# Patient Record
Sex: MALE | Race: OTHER | Employment: OTHER | URBAN - METROPOLITAN AREA
[De-identification: names, ages, dates, MRNs, and addresses within clinical notes are randomized per-mention and may not be internally consistent; named-entity substitution may affect disease eponyms.]

---

## 2018-06-24 ENCOUNTER — APPOINTMENT (OUTPATIENT)
Dept: GENERAL RADIOLOGY | Age: 83
End: 2018-06-24
Attending: EMERGENCY MEDICINE
Payer: MEDICAID

## 2018-06-24 ENCOUNTER — APPOINTMENT (OUTPATIENT)
Dept: CT IMAGING | Age: 83
End: 2018-06-24
Attending: EMERGENCY MEDICINE
Payer: MEDICAID

## 2018-06-24 ENCOUNTER — HOSPITAL ENCOUNTER (EMERGENCY)
Age: 83
Discharge: HOME | End: 2018-06-24
Attending: EMERGENCY MEDICINE
Payer: MEDICAID

## 2018-06-24 VITALS
BODY MASS INDEX: 25.71 KG/M2 | SYSTOLIC BLOOD PRESSURE: 159 MMHG | HEART RATE: 78 BPM | WEIGHT: 160 LBS | HEIGHT: 66 IN | DIASTOLIC BLOOD PRESSURE: 73 MMHG | TEMPERATURE: 97.9 F | OXYGEN SATURATION: 98 % | RESPIRATION RATE: 16 BRPM
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
## 2018-06-24 DIAGNOSIS — S20.222A BACK CONTUSION, LEFT, INITIAL ENCOUNTER: ICD-10-CM

## 2018-06-24 DIAGNOSIS — S00.83XA CONTUSION OF FACE, INITIAL ENCOUNTER: ICD-10-CM

## 2018-06-24 PROCEDURE — 71100 X-RAY EXAM RIBS UNI 2 VIEWS: CPT

## 2018-06-24 PROCEDURE — 72072 X-RAY EXAM THORAC SPINE 3VWS: CPT

## 2018-06-24 PROCEDURE — 99282 EMERGENCY DEPT VISIT SF MDM: CPT

## 2018-06-24 PROCEDURE — 70450 CT HEAD/BRAIN W/O DYE: CPT

## 2018-06-24 PROCEDURE — 71046 X-RAY EXAM CHEST 2 VIEWS: CPT

## 2018-06-24 RX ORDER — HYDROCODONE BITARTRATE AND ACETAMINOPHEN 5; 325 MG/1; MG/1
TABLET ORAL
Qty: 12 TAB | Refills: 0 | Status: SHIPPED | OUTPATIENT
Start: 2018-06-24 | End: 2021-10-28

## 2018-06-24 NOTE — ED NOTES
Patient armband removed and shredded. Family member given script x1 with discharge instructions and verbalizes understanding. See scanned documents for family member signing that she rec'd discharge instructions. Pt discharged home via w/c with family members. No distress noted.

## 2018-06-24 NOTE — ED PROVIDER NOTES
EMERGENCY DEPARTMENT HISTORY AND PHYSICAL EXAM    Date: 6/24/2018  Patient Name: Gabriel Eaton    History of Presenting Illness     Chief Complaint   Patient presents with    Fall    Back Pain         History Provided By: Patient    Chief Complaint: back pain  Duration: 1 Days  Timing:  Acute  Location: generalized back  Quality: Aching  Severity: Moderate  Associated Symptoms: neck pain    Additional History (Context):   12:41 PM  Gabriel Eaton is a 82 y.o. male with PMHX of HTN who presents to the emergency department C/O back pain onset yesterday after falling down stairs at home. Associated sxs include neck pain. Pt had tylenol 6 hours ago for relief. Pt usually maneuvers with cane. Pt denies hit to head, arm/leg pain, urinary or fecal incontinence, numbness/weakness and any other sxs or complaints. PCP: Zain Fofana MD        Past History     Past Medical History:  History reviewed. No pertinent past medical history. Past Surgical History:  History reviewed. No pertinent surgical history. Family History:  History reviewed. No pertinent family history. Social History:  Social History   Substance Use Topics    Smoking status: Former Smoker    Smokeless tobacco: None    Alcohol use No       Allergies:  No Known Allergies      Review of Systems   Review of Systems   Musculoskeletal: Positive for back pain and neck pain. Neurological: Negative for weakness and numbness. All other systems reviewed and are negative. Physical Exam     Vitals:    06/24/18 1219   BP: 159/73   Pulse: 78   Resp: 16   Temp: 97.9 °F (36.6 °C)   SpO2: 98%   Weight: 72.6 kg (160 lb)   Height: 5' 6\" (1.676 m)     Physical Exam   Nursing note and vitals reviewed.   Constitutional: elderly appearing, no acute distress  Head: Normocephalic, contusion and small abrasion to right forehead  Eyes: Pupils are equal, round, and reactive to light, EOMI  Neck: Supple, non-tender  Cardiovascular: Regular rate and rhythm, no murmurs, rubs, or gallops  Chest: Normal work of breathing and chest excursion bilaterally  Lungs: Clear to ausculation bilaterally  Abdomen: Soft, non tender, non distended, normoactive bowel sounds  Back: No evidence of trauma or deformity. Thoracic spine tenderness without bony point tenderness  Extremities: No evidence of trauma or deformity, no LE edema  Skin: Warm and dry, normal cap refill. Abrasion over left parathoracic back  Neuro: Alert and appropriate, CN intact, normal speech, strength and sensation symmetric bilaterally, normal coordination  Psychiatric: Normal mood and affect       Diagnostic Study Results     Labs -   No results found for this or any previous visit (from the past 12 hour(s)). Radiologic Studies -   XR RIBS LT UNI 2 V   Final Result   IMPRESSION:     Normal.    As read by the radiologist.     XR CHEST PA LAT   Final Result   IMPRESSION:     Minimal atelectasis right base. Chronic right apical pleural scarring. Old  fracture right clavicle. As read by the radiologist.     XR SPINE [facility] 3 V   Final Result   IMPRESSION:     Osseous findings of DISH. No compression deformity identified. As read by the radiologist.     CT HEAD WO CONT   Final Result   IMPRESSION:     1. No acute intracranial hemorrhage, mass effect, midline shift, or herniation. No definite CT evidence of acute cortical infarct is seen. Please note that  noncontrast head CT may be normal in early acute infarct. As read by the radiologist.     CT Results  (Last 48 hours)               06/24/18 1332  CT HEAD WO CONT Final result    Impression:  IMPRESSION:       1. No acute intracranial hemorrhage, mass effect, midline shift, or herniation. No definite CT evidence of acute cortical infarct is seen. Please note that   noncontrast head CT may be normal in early acute infarct. Narrative:  EXAM: CT head       INDICATION: Closed head trauma, status post fall.        COMPARISON: Brain MRI dated April 22, 2011. TECHNIQUE: Axial CT imaging of the head was performed without intravenous   contrast. Multiplanar reconstructions were performed. One or more dose   reduction techniques were used on this CT: automated exposure control,   adjustment of the mAs and/or kVp according to patient's size, and iterative   reconstruction techniques. The specific techniques utilized on this CT exam have   been documented in the patient's electronic medical record.       _______________       FINDINGS:       VENTRICLES/EXTRA-AXIAL SPACES: The ventricles and sulci are normal in their size   and configuration for age. No extra-axial fluid collections. BRAIN PARENCHYMA: There is no evidence of acute intracranial hemorrhage, mass   effect, midline shift, or herniation. No definite CT evidence of acute cortical   infarct is seen. The gray-white matter differentiation is within normal limits. OSSEOUS STRUCTURES: No fracture is seen. PARANASAL SINUSES/MASTOIDS: Visualized paranasal sinuses and mastoid air cells   are clear. ORBITS: The visualized orbits are unremarkable. OTHER: None.         _______________               CXR Results  (Last 48 hours)               06/24/18 1348  XR CHEST PA LAT Final result    Impression:  IMPRESSION:       Minimal atelectasis right base. Chronic right apical pleural scarring. Old   fracture right clavicle. Narrative:  Chest PA and lateral       HISTORY: Falling injury with posterior chest and thoracic pain. FINDINGS: Cardiac size is normal. Minimal atelectasis right base. Lungs   otherwise clear except for chronic right apical capping. Normal pulmonary   vasculature. Bridging hyperostosis thoracic vertebral column. Old fracture right   clavicle. Medications given in the ED-  Medications - No data to display      Medical Decision Making   I am the first provider for this patient.     I reviewed the vital signs, available nursing notes, past medical history, past surgical history, family history and social history. Vital Signs-Reviewed the patient's vital signs. Procedures:  Procedures    ED Course:   12:41 PM Initial assessment performed. The patients presenting problems have been discussed, and they are in agreement with the care plan formulated and outlined with them. I have encouraged them to ask questions as they arise throughout their visit. Diagnosis and Disposition     Discussion:  82 y.o male s/p mechanical fall. No acute abnormality on imaging. Plan for discharge home with family with pain management, discharge follow up and return precautions. Pt and family understand and agree with plan. DISCHARGE NOTE:  2:24 PM  Lizabeth Vasquez's  results have been reviewed with him. He has been counseled regarding his diagnosis, treatment, and plan. He verbally conveys understanding and agreement of the signs, symptoms, diagnosis, treatment and prognosis and additionally agrees to follow up as discussed. He also agrees with the care-plan and conveys that all of his questions have been answered. I have also provided discharge instructions for him that include: educational information regarding their diagnosis and treatment, and list of reasons why they would want to return to the ED prior to their follow-up appointment, should his condition change. He has been provided with education for proper emergency department utilization. CLINICAL IMPRESSION:    1. Fall, initial encounter    2. Contusion of face, initial encounter    3. Back contusion, left, initial encounter        PLAN:  1. D/C Home  2. Current Discharge Medication List      START taking these medications    Details   HYDROcodone-acetaminophen (NORCO) 5-325 mg per tablet Take 1-2 tablets PO every 4-6 hours as needed for pain control.   If over the counter ibuprofen or acetaminophen was suggested, then only take the vicodin for pain not well controlled with the over the counter medication. Qty: 12 Tab, Refills: 0    Associated Diagnoses: Contusion of face, initial encounter; Back contusion, left, initial encounter           3. Follow-up Information     Follow up With Details Comments Duane Draper MD Schedule an appointment as soon as possible for a visit in 3 days For primary care follow up. [id number] [facility] [address]      [facility] EMERGENCY DEPT Go to As needed, If symptoms worsen [address] [id number]  [phone]        _______________________________    Attestations: This note is prepared by Karin Romo and Brenda Acharya, acting as Scribe for Dontrell Estevez MD .    Dontrell Estevez MD:  The scribe's documentation has been prepared under my direction and personally reviewed by me in its entirety.   I confirm that the note above accurately reflects all work, treatment, procedures, and medical decision making performed by me.  _______________________________

## 2018-06-24 NOTE — DISCHARGE INSTRUCTIONS
Lesión en la zachary: Instrucciones de cuidado - [ Head Injury: Care Instructions ]  Instrucciones de cuidado    La mayoría de las lesiones en la zachary son Edwar Aldo. Los [employer], los dunbar y los raspones en la zachary y la duane suelen sanar tim y pueden tratarse igual que las lesiones en otras partes del cuerpo. Aunque es raro, de vez en cuando puede aparecer un problema más hubert después de aparicio regreso al [facility]. Por eso, es italia buena idea estar atento a síntomas por un [address] La atención de seguimiento es italia parte clave de aparicio tratamiento y seguridad. Asegúrese de hacer y acudir a todas las citas, y llame a aparicio médico si está teniendo problemas. También es italia buena idea saber los resultados de los exámenes y mantener italia lista de los medicamentos que jasbir. ¿Cómo puede cuidarse en el hogar? · Siga las instrucciones de aparicio médico. Él o jelani le dirá si necesita de alguien que lo observe atentamente salvador las 24 horas o más [place]. · No se esfuerce por unos días o más tiempo si no se siente tim. · Pregúntele a aparicio médico cuándo puede volver a actividades veronica manejar un coche, montar en bicicleta u operar maquinaria. ¿Cuándo debe pedir ayuda? Llame al 911 en cualquier momento que considere que necesita atención de [place]. Por ejemplo, llame si:  ? · Tiene un episodio de convulsiones. ? · Se desmayó (perdió el conocimiento). ? · Se siente confuso o no puede mantenerse despierto. ? Llame a aparicio médico ahora mismo o busque atención médica inmediata si:  ? · Tiene nuevo o peor vómito. ? · Se siente menos alerta. ? · Tiene nueva debilidad o entumecimiento en cualquier parte del cuerpo. ?Preste especial atención a los [facility], y asegúrese de comunicarse con aparicio médico si:  ? · No mejora veronica esperaba. ? · Tiene nuevos síntomas, veronica natalie de Juan Ramon, problemas para concentrarse o cambios en el estado de ánimo. ¿Dónde puede encontrar más información en inglés?   Laura Wong a [email or web address] Kiera Mix S634 en la búsqueda para aprender más acerca de \"Lesión en la zachary: Instrucciones de cuidado - [ Head Injury: Care Instructions ]. \"  Revisado: 14 octubre, 2016  Versión del contenido: 11.4  © 0844-4440 [employer], [employer]. Las instrucciones de cuidado fueron adaptadas bajo licencia por [employer] (which disclaims liability or warranty for this information). Si usted tiene [place] afección médica o sobre estas instrucciones, siempre pregunte a aparicio profesional de komal. [employer], [employer] niega toda garantía o responsabilidad por aparicio uso de esta información.

## 2018-06-24 NOTE — ED TRIAGE NOTES
Pt presents to ED today c/o back pain from a fall last night. Pt denies numbness, tingling or weakness.

## 2021-10-24 ENCOUNTER — HOSPITAL ENCOUNTER (INPATIENT)
Age: 86
LOS: 4 days | Discharge: HOME | DRG: 263 | End: 2021-10-28
Attending: EMERGENCY MEDICINE | Admitting: FAMILY MEDICINE
Payer: MEDICAID

## 2021-10-24 ENCOUNTER — APPOINTMENT (OUTPATIENT)
Dept: CT IMAGING | Age: 86
DRG: 263 | End: 2021-10-24
Attending: EMERGENCY MEDICINE
Payer: MEDICAID

## 2021-10-24 DIAGNOSIS — R10.84 ABDOMINAL PAIN, GENERALIZED: Primary | ICD-10-CM

## 2021-10-24 DIAGNOSIS — K80.12 CALCULUS OF GALLBLADDER WITH ACUTE ON CHRONIC CHOLECYSTITIS WITHOUT OBSTRUCTION: ICD-10-CM

## 2021-10-24 DIAGNOSIS — K80.20 CALCULUS OF GALLBLADDER WITHOUT CHOLECYSTITIS WITHOUT OBSTRUCTION: ICD-10-CM

## 2021-10-24 PROBLEM — R10.9 ABDOMINAL PAIN: Status: ACTIVE | Noted: 2021-10-24

## 2021-10-24 PROBLEM — R31.9 HEMATURIA: Status: ACTIVE | Noted: 2021-10-24

## 2021-10-24 PROBLEM — I10 HTN (HYPERTENSION): Status: ACTIVE | Noted: 2021-10-24

## 2021-10-24 PROBLEM — E78.5 HYPERLIPEMIA: Status: ACTIVE | Noted: 2021-10-24

## 2021-10-24 PROBLEM — N28.89 RENAL MASS: Status: ACTIVE | Noted: 2021-10-24

## 2021-10-24 PROBLEM — K80.70 CALCULUS OF GALLBLADDER AND BILE DUCT WITHOUT CHOLECYSTITIS: Status: ACTIVE | Noted: 2021-10-24

## 2021-10-24 PROBLEM — R11.2 NAUSEA AND VOMITING: Status: ACTIVE | Noted: 2021-10-24

## 2021-10-24 LAB
ALBUMIN SERPL-MCNC: 3.7 G/DL (ref 3.4–5)
ALBUMIN/GLOB SERPL: 0.9 {RATIO} (ref 0.8–1.7)
ALP SERPL-CCNC: 116 U/L (ref 45–117)
ALT SERPL-CCNC: 52 U/L (ref 16–61)
AMORPH CRY URNS QL MICRO: ABNORMAL
ANION GAP SERPL CALC-SCNC: 5 MMOL/L (ref 3–18)
APPEARANCE UR: ABNORMAL
AST SERPL-CCNC: 46 U/L (ref 10–38)
BACTERIA URNS QL MICRO: ABNORMAL /HPF
BASOPHILS # BLD: 0.1 K/UL (ref 0–0.1)
BASOPHILS NFR BLD: 1 % (ref 0–2)
BILIRUB SERPL-MCNC: 1.1 MG/DL (ref 0.2–1)
BILIRUB UR QL: NEGATIVE
BUN SERPL-MCNC: 19 MG/DL (ref 7–18)
BUN/CREAT SERPL: 30 (ref 12–20)
CALCIUM SERPL-MCNC: 9 MG/DL (ref 8.5–10.1)
CHLORIDE SERPL-SCNC: 103 MMOL/L (ref 100–111)
CO2 SERPL-SCNC: 29 MMOL/L (ref 21–32)
COLOR UR: YELLOW
CREAT SERPL-MCNC: 0.63 MG/DL (ref 0.6–1.3)
DIFFERENTIAL METHOD BLD: ABNORMAL
EOSINOPHIL # BLD: 0 K/UL (ref 0–0.4)
EOSINOPHIL NFR BLD: 0 % (ref 0–5)
EPITH CASTS URNS QL MICRO: ABNORMAL /LPF (ref 0–5)
ERYTHROCYTE [DISTWIDTH] IN BLOOD BY AUTOMATED COUNT: 13.4 % (ref 11.6–14.5)
GLOBULIN SER CALC-MCNC: 3.9 G/DL (ref 2–4)
GLUCOSE SERPL-MCNC: 142 MG/DL (ref 74–99)
GLUCOSE UR STRIP.AUTO-MCNC: NEGATIVE MG/DL
HCT VFR BLD AUTO: 47.3 % (ref 36–48)
HGB BLD-MCNC: 15.8 G/DL (ref 13–16)
HGB UR QL STRIP: ABNORMAL
KETONES UR QL STRIP.AUTO: 15 MG/DL
LACTATE SERPL-SCNC: 1.8 MMOL/L (ref 0.4–2)
LEUKOCYTE ESTERASE UR QL STRIP.AUTO: NEGATIVE
LIPASE SERPL-CCNC: 62 U/L (ref 73–393)
LYMPHOCYTES # BLD: 1.3 K/UL (ref 0.9–3.6)
LYMPHOCYTES NFR BLD: 12 % (ref 21–52)
MCH RBC QN AUTO: 30.7 PG (ref 24–34)
MCHC RBC AUTO-ENTMCNC: 33.4 G/DL (ref 31–37)
MCV RBC AUTO: 92 FL (ref 78–100)
MONOCYTES # BLD: 1 K/UL (ref 0.05–1.2)
MONOCYTES NFR BLD: 9 % (ref 3–10)
MUCOUS THREADS URNS QL MICRO: ABNORMAL /LPF
NEUTS SEG # BLD: 8.6 K/UL (ref 1.8–8)
NEUTS SEG NFR BLD: 78 % (ref 40–73)
NITRITE UR QL STRIP.AUTO: NEGATIVE
PH UR STRIP: 8.5 [PH] (ref 5–8)
PLATELET # BLD AUTO: 203 K/UL (ref 135–420)
PMV BLD AUTO: 10.5 FL (ref 9.2–11.8)
POTASSIUM SERPL-SCNC: 3.9 MMOL/L (ref 3.5–5.5)
PROT SERPL-MCNC: 7.6 G/DL (ref 6.4–8.2)
PROT UR STRIP-MCNC: 100 MG/DL
RBC # BLD AUTO: 5.14 M/UL (ref 4.35–5.65)
RBC #/AREA URNS HPF: ABNORMAL /HPF (ref 0–5)
SODIUM SERPL-SCNC: 137 MMOL/L (ref 136–145)
SP GR UR REFRACTOMETRY: 1.02 (ref 1–1.03)
TROPONIN I SERPL-MCNC: <0.02 NG/ML (ref 0–0.04)
UROBILINOGEN UR QL STRIP.AUTO: 1 EU/DL (ref 0.2–1)
WBC # BLD AUTO: 11.1 K/UL (ref 4.6–13.2)
WBC URNS QL MICRO: ABNORMAL /HPF (ref 0–5)

## 2021-10-24 PROCEDURE — 81001 URINALYSIS AUTO W/SCOPE: CPT

## 2021-10-24 PROCEDURE — 74011250637 HC RX REV CODE- 250/637: Performed by: EMERGENCY MEDICINE

## 2021-10-24 PROCEDURE — 74011250636 HC RX REV CODE- 250/636: Performed by: EMERGENCY MEDICINE

## 2021-10-24 PROCEDURE — 84484 ASSAY OF TROPONIN QUANT: CPT

## 2021-10-24 PROCEDURE — 85025 COMPLETE CBC W/AUTO DIFF WBC: CPT

## 2021-10-24 PROCEDURE — 99285 EMERGENCY DEPT VISIT HI MDM: CPT

## 2021-10-24 PROCEDURE — 80053 COMPREHEN METABOLIC PANEL: CPT

## 2021-10-24 PROCEDURE — 74011000636 HC RX REV CODE- 636: Performed by: EMERGENCY MEDICINE

## 2021-10-24 PROCEDURE — 93005 ELECTROCARDIOGRAM TRACING: CPT

## 2021-10-24 PROCEDURE — 96374 THER/PROPH/DIAG INJ IV PUSH: CPT

## 2021-10-24 PROCEDURE — 83605 ASSAY OF LACTIC ACID: CPT

## 2021-10-24 PROCEDURE — 83690 ASSAY OF LIPASE: CPT

## 2021-10-24 PROCEDURE — 74177 CT ABD & PELVIS W/CONTRAST: CPT

## 2021-10-24 PROCEDURE — 74011250636 HC RX REV CODE- 250/636: Performed by: FAMILY MEDICINE

## 2021-10-24 PROCEDURE — 65270000029 HC RM PRIVATE

## 2021-10-24 PROCEDURE — 96376 TX/PRO/DX INJ SAME DRUG ADON: CPT

## 2021-10-24 PROCEDURE — 74011000250 HC RX REV CODE- 250: Performed by: EMERGENCY MEDICINE

## 2021-10-24 PROCEDURE — 74011250637 HC RX REV CODE- 250/637: Performed by: FAMILY MEDICINE

## 2021-10-24 RX ORDER — AMLODIPINE BESYLATE 5 MG/1
5 TABLET ORAL DAILY
Status: DISCONTINUED | OUTPATIENT
Start: 2021-10-24 | End: 2021-10-28 | Stop reason: HOSPADM

## 2021-10-24 RX ORDER — MORPHINE SULFATE 4 MG/ML
4 INJECTION INTRAVENOUS
Status: COMPLETED | OUTPATIENT
Start: 2021-10-24 | End: 2021-10-24

## 2021-10-24 RX ORDER — HYDROCHLOROTHIAZIDE 12.5 MG/1
12.5 TABLET ORAL DAILY
COMMUNITY

## 2021-10-24 RX ORDER — MORPHINE SULFATE 4 MG/ML
4 INJECTION INTRAVENOUS
Status: DISCONTINUED | OUTPATIENT
Start: 2021-10-24 | End: 2021-10-25

## 2021-10-24 RX ORDER — ONDANSETRON 2 MG/ML
8 INJECTION INTRAMUSCULAR; INTRAVENOUS
Status: DISCONTINUED | OUTPATIENT
Start: 2021-10-24 | End: 2021-10-25

## 2021-10-24 RX ORDER — ATORVASTATIN CALCIUM 20 MG/1
20 TABLET, FILM COATED ORAL DAILY
COMMUNITY
End: 2021-10-28

## 2021-10-24 RX ORDER — MORPHINE SULFATE 4 MG/ML
4 INJECTION INTRAVENOUS ONCE
Status: COMPLETED | OUTPATIENT
Start: 2021-10-24 | End: 2021-10-24

## 2021-10-24 RX ORDER — SODIUM CHLORIDE 9 MG/ML
100 INJECTION, SOLUTION INTRAVENOUS CONTINUOUS
Status: DISCONTINUED | OUTPATIENT
Start: 2021-10-24 | End: 2021-10-25

## 2021-10-24 RX ADMIN — LIDOCAINE HYDROCHLORIDE 40 ML: 20 SOLUTION ORAL; TOPICAL at 15:40

## 2021-10-24 RX ADMIN — IOPAMIDOL 100 ML: 612 INJECTION, SOLUTION INTRAVENOUS at 12:29

## 2021-10-24 RX ADMIN — SODIUM CHLORIDE 1000 ML: 900 INJECTION, SOLUTION INTRAVENOUS at 11:56

## 2021-10-24 RX ADMIN — MORPHINE SULFATE 4 MG: 4 INJECTION INTRAVENOUS at 21:15

## 2021-10-24 RX ADMIN — AMLODIPINE BESYLATE 5 MG: 5 TABLET ORAL at 17:48

## 2021-10-24 RX ADMIN — MORPHINE SULFATE 4 MG: 4 INJECTION INTRAVENOUS at 17:45

## 2021-10-24 RX ADMIN — MORPHINE SULFATE 4 MG: 4 INJECTION INTRAVENOUS at 14:15

## 2021-10-24 RX ADMIN — ONDANSETRON 8 MG: 2 INJECTION INTRAMUSCULAR; INTRAVENOUS at 23:20

## 2021-10-24 RX ADMIN — ONDANSETRON 8 MG: 2 INJECTION INTRAMUSCULAR; INTRAVENOUS at 17:44

## 2021-10-24 RX ADMIN — SODIUM CHLORIDE 100 ML/HR: 900 INJECTION, SOLUTION INTRAVENOUS at 17:44

## 2021-10-24 RX ADMIN — MORPHINE SULFATE 4 MG: 4 INJECTION INTRAVENOUS at 11:52

## 2021-10-24 NOTE — ROUTINE PROCESS
TRANSFER - OUT REPORT:    Verbal report given to [facility] RN(name) on Jemma Jalloh  being transferred to [facility](unit) for routine progression of care       Report consisted of patients Situation, Background, Assessment and   Recommendations(SBAR). Information from the following report(s) SBAR was reviewed with the receiving nurse. Lines:   Peripheral IV 10/24/21 Left Antecubital (Active)   Site Assessment Clean, dry, & intact 10/24/21 1150   Phlebitis Assessment 0 10/24/21 1150   Infiltration Assessment 0 10/24/21 1150   Dressing Status Clean, dry, & intact 10/24/21 1150   Dressing Type Tape;Transparent 10/24/21 1150   Hub Color/Line Status Pink;Flushed;Patent 10/24/21 1150        Opportunity for questions and clarification was provided.       Patient transported with:   [employer]

## 2021-10-24 NOTE — ED PROVIDER NOTES
EMERGENCY DEPARTMENT HISTORY AND PHYSICAL EXAM    Date: 10/24/2021  Patient Name: Ramsey Marshall    History of Presenting Illness     Chief Complaint   Patient presents with    Abdominal Pain         History Provided By: Patient    Ramsey Marshall is a 82-year-old male with past medical history of hypertension presents for evaluation of abdominal pain. Patient reports abdominal distention and epigastric discomfort since yesterday evening. Patient states that his pain is sharp, denies any exacerbating or relieving factors. He had a small bowel movement this morning. Patient had reportedly told EMS that he has had similar symptoms with constipation, though he denies this. No previous abdominal surgeries, he is still passing gas otherwise. Patient denies any associated fever, cough, chest pain, shortness of breath. History is obtained via a [employer] [place] . PCP: Debbie Elder MD    Current Facility-Administered Medications   Medication Dose Route Frequency Provider Last Rate Last Admin    0.9% sodium chloride infusion  100 mL/hr IntraVENous CONTINUOUS Briana Allen MD        influenza vaccine 2021-22 (6 mos+)(PF) (FLUARIX/FLULAVAL/FLUZONE QUAD) injection 0.5 mL  1 Each IntraMUSCular PRIOR TO DISCHARGE Briana Allen MD        ondansetron [facility]) injection 8 mg  8 mg IntraVENous Q6H PRN Briana Allen MD        morphine injection 4 mg  4 mg IntraVENous Q4H PRN Briana Allen MD        amLODIPine (NORVASC) tablet 5 mg  5 mg Oral DAILY Briana Allen MD           Past History     Past Medical History:  No past medical history on file. Past Surgical History:  No past surgical history on file. Family History:  No family history on file.     Social History:  Social History     Tobacco Use    Smoking status: Former Smoker   Substance Use Topics    Alcohol use: No    Drug use: Not on file       Allergies:  No Known Allergies      Review of Systems   Review of Systems   Constitutional: Negative for activity change and fever. HENT: Negative for congestion and sore throat. Eyes: Negative for discharge. Respiratory: Negative for apnea. Cardiovascular: Negative for chest pain. Gastrointestinal: Positive for abdominal pain. Negative for abdominal distention. Genitourinary: Negative for dysuria and flank pain. Musculoskeletal: Negative for arthralgias. Skin: Negative for rash. Neurological: Negative for dizziness and weakness. Hematological: Negative for adenopathy. Psychiatric/Behavioral: Negative for agitation. All other systems reviewed and are negative.       Physical Exam     Vitals:    10/24/21 1445 10/24/21 1500 10/24/21 1641 10/24/21 1642   BP: (!) 169/77 (!) 162/62 (!) 194/79 (!) 193/72   Pulse: 61 (!) 57 68    Resp: 18 13 16    Temp:   99.2 °F (37.3 °C)    SpO2: 100% 98% 100%    Weight:         Physical Exam    Nursing notes and vital signs reviewed    Constitutional: Non toxic appearing, moderate distress  Head: Normocephalic, Atraumatic  Eyes: EOMI  Neck: Supple  Cardiovascular: Regular rate and rhythm, no murmurs, rubs, or gallops  Chest: Normal work of breathing and chest excursion bilaterally  Lungs: Clear to ausculation bilaterally  Abdomen: Soft, non tender, non distended, normoactive bowel sounds  Back: No evidence of trauma or deformity  Extremities: No evidence of trauma or deformity, no LE edema  Skin: Warm and dry, normal cap refill  Neuro: Alert and appropriate, CN intact, normal speech, strength and sensation full and symmetric bilaterally, normal gait, normal coordination  Psychiatric: Normal mood and affect      Diagnostic Study Results     Labs -     Recent Results (from the past 12 hour(s))   CBC WITH AUTOMATED DIFF    Collection Time: 10/24/21 11:42 AM   Result Value Ref Range    WBC 11.1 4.6 - 13.2 K/uL    RBC 5.14 4.35 - 5.65 M/uL    HGB 15.8 13.0 - 16.0 g/dL    HCT 47.3 36.0 - 48.0 %    MCV 92.0 78.0 - 100.0 FL    MCH 30.7 24.0 - 34.0 PG    MCHC 33.4 31.0 - 37.0 g/dL RDW 13.4 11.6 - 14.5 %    PLATELET 058 996 - 310 K/uL    MPV 10.5 9.2 - 11.8 FL    NEUTROPHILS 78 (H) 40 - 73 %    LYMPHOCYTES 12 (L) 21 - 52 %    MONOCYTES 9 3 - 10 %    EOSINOPHILS 0 0 - 5 %    BASOPHILS 1 0 - 2 %    ABS. NEUTROPHILS 8.6 (H) 1.8 - 8.0 K/UL    ABS. LYMPHOCYTES 1.3 0.9 - 3.6 K/UL    ABS. MONOCYTES 1.0 0.05 - 1.2 K/UL    ABS. EOSINOPHILS 0.0 0.0 - 0.4 K/UL    ABS. BASOPHILS 0.1 0.0 - 0.1 K/UL    DF AUTOMATED     METABOLIC PANEL, COMPREHENSIVE    Collection Time: 10/24/21 11:42 AM   Result Value Ref Range    Sodium 137 136 - 145 mmol/L    Potassium 3.9 3.5 - 5.5 mmol/L    Chloride 103 100 - 111 mmol/L    CO2 29 21 - 32 mmol/L    Anion gap 5 3.0 - 18 mmol/L    Glucose 142 (H) 74 - 99 mg/dL    BUN 19 (H) 7.0 - 18 MG/DL    Creatinine 0.63 0.6 - 1.3 MG/DL    BUN/Creatinine ratio 30 (H) 12 - 20      GFR est AA >60 >60 ml/min/1.73m2    GFR est non-AA >60 >60 ml/min/1.73m2    Calcium 9.0 8.5 - 10.1 MG/DL    Bilirubin, total 1.1 (H) 0.2 - 1.0 MG/DL    ALT (SGPT) 52 16 - 61 U/L    AST (SGOT) 46 (H) 10 - 38 U/L    Alk.  phosphatase 116 45 - 117 U/L    Protein, total 7.6 6.4 - 8.2 g/dL    Albumin 3.7 3.4 - 5.0 g/dL    Globulin 3.9 2.0 - 4.0 g/dL    A-G Ratio 0.9 0.8 - 1.7     LIPASE    Collection Time: 10/24/21 11:42 AM   Result Value Ref Range    Lipase 62 (L) 73 - 393 U/L   LACTIC ACID    Collection Time: 10/24/21 11:42 AM   Result Value Ref Range    Lactic acid 1.8 0.4 - 2.0 MMOL/L   TROPONIN I    Collection Time: 10/24/21 11:42 AM   Result Value Ref Range    Troponin-I, QT <0.02 0.0 - 0.045 NG/ML   URINALYSIS W/ RFLX MICROSCOPIC    Collection Time: 10/24/21 11:58 AM   Result Value Ref Range    Color YELLOW      Appearance TURBID      Specific gravity 1.022 1.005 - 1.030      pH (UA) 8.5 (H) 5.0 - 8.0      Protein 100 (A) NEG mg/dL    Glucose Negative NEG mg/dL    Ketone 15 (A) NEG mg/dL    Bilirubin Negative NEG      Blood LARGE (A) NEG      Urobilinogen 1.0 0.2 - 1.0 EU/dL    Nitrites Negative NEG Leukocyte Esterase Negative NEG     URINE MICROSCOPIC ONLY    Collection Time: 10/24/21 11:58 AM   Result Value Ref Range    WBC 0 to 3 0 - 5 /hpf    RBC TOO NUMEROUS TO COUNT 0 - 5 /hpf    Epithelial cells FEW 0 - 5 /lpf    Bacteria NONE SEEN NEG /hpf    Mucus 1+ (A) NEG /lpf    Amorphous Crystals 2+ (A) NEG       Radiologic Studies -   CT ABD PELV W CONT   Final Result      1. Cholelithiasis without evidence of cholecystitis. 2. Left inguinal hernia containing a portion of the sigmoid colon. No evidence   of bowel obstruction. 3. Multiple colonic diverticula without diverticulitis. 4. Probable hepatic steatosis. 5. Imaging stigmata in keeping with ankylosing spondylitis. No superimposed   acute osseous abnormality. Advanced bilateral hip joint arthritis. 6. Multiple bilateral renal cysts with indeterminate posterior interpolar left   renal structure. If further characterization is required, nonemergent/routine   renal protocol CT follow-up could be considered. CT Results  (Last 48 hours)               10/24/21 1240  CT ABD PELV W CONT Final result    Impression:      1. Cholelithiasis without evidence of cholecystitis. 2. Left inguinal hernia containing a portion of the sigmoid colon. No evidence   of bowel obstruction. 3. Multiple colonic diverticula without diverticulitis. 4. Probable hepatic steatosis. 5. Imaging stigmata in keeping with ankylosing spondylitis. No superimposed   acute osseous abnormality. Advanced bilateral hip joint arthritis. 6. Multiple bilateral renal cysts with indeterminate posterior interpolar left   renal structure. If further characterization is required, nonemergent/routine   renal protocol CT follow-up could be considered. Narrative:  EXAM: CT of the abdomen and pelvis       INDICATION: 82year-old patient with abdominal pain       COMPARISON: None.        TECHNIQUE: Axial CT imaging of the abdomen and pelvis was performed with   intravenous contrast. Multiplanar reformats were generated. One or more dose reduction techniques were used on this CT: automated exposure   control, adjustment of the mAs and/or kVp according to patient size, and   iterative reconstruction techniques. The specific techniques used on this CT   exam have been documented in the patient's electronic medical record. Digital   Imaging and Communications in Medicine (DICOM) format image data are available   to nonaffiliated external healthcare facilities or entities on a secure, media   free, reciprocally searchable basis with patient authorization for at least a   12-month period after this study. _______________       FINDINGS:       LOWER CHEST: Mild dependent atelectasis. No alveolar consolidation or pleural   abnormality. Normal cardiac size. No pericardial effusion. LIVER, BILIARY: Diffuse hepatic hypoattenuation without suspicious hepatic   lesion. No biliary dilation. Small gallstones are present within a   nondistended/noninflamed gallbladder. PANCREAS: Mild fatty infiltration the pancreas. No pancreatic ductal dilatation. SPLEEN: Normal.       ADRENALS: Normal.       KIDNEYS/URETERS/BLADDER: Symmetric renal enhancement. No hydronephrosis. Several   bilateral renal hypodensities are present, too small to accurately characterize. In addition, there is posterior interpolar predominantly exophytic intermediate   density left renal lesion measuring approximately 2.3 x 2.4 cm in size. Urinary   bladder normal in CT appearance to       PELVIC ORGANS: Left inguinal hernia present containing a small portion of the   sigmoid colon. VASCULATURE: Diffuse atherosclerosis. No evidence of aneurysmal dilatation. LYMPH NODES: No enlarged lymph nodes.        GASTROINTESTINAL TRACT:      > At the site of left inguinal hernia, no accompanying bowel obstruction or   inflammatory change involving the adjacent sigmoid colon. Multiple colonic   diverticula are present without evidence of diverticulitis.      > Normal appendix.      > Small hiatal hernia. BONES: Extensive bridging syndesmophyte formation noted throughout the thoracic   and lumbar spine. Ankylosis of bilateral sacroiliac joints. Advanced bilateral   hip joint arthritis. No acute osseous abnormality. OTHER: Faint groundglass density is present at the root of the mesentery. _______________               CXR Results  (Last 48 hours)    None          Medications given in the ED-  Medications   0.9% sodium chloride infusion (has no administration in time range)   influenza vaccine 2021-22 (6 mos+)(PF) (FLUARIX/FLULAVAL/FLUZONE QUAD) injection 0.5 mL (has no administration in time range)   ondansetron (ZOFRAN) injection 8 mg (has no administration in time range)   morphine injection 4 mg (has no administration in time range)   amLODIPine (NORVASC) tablet 5 mg (has no administration in time range)   morphine injection 4 mg (4 mg IntraVENous Given 10/24/21 1152)   sodium chloride 0.9 % bolus infusion 1,000 mL (0 mL IntraVENous Stopped 10/24/21 1227)   iopamidoL (ISOVUE 300) 61 % contrast injection 100 mL (100 mL IntraVENous Given 10/24/21 1229)   morphine injection 4 mg (4 mg IntraVENous Given 10/24/21 1415)   mylanta/viscous lidocaine (GI COCKTAIL) (40 mL Oral Given 10/24/21 1540)         Medical Decision Making   I am the first provider for this patient. I reviewed the vital signs, available nursing notes, past medical history, past surgical history, family history and social history. Vital Signs-Reviewed the patient's vital signs.     Pulse Oximetry Analysis - 100% on room air, not hypoxic     Cardiac Monitor:  Rate: 63 bpm  Rhythm: Normal sinus rhythm    EKG interpretation: (Preliminary)  EKG read by Dr. Precious English at 1134   Sinus bradycardia, rate 56  Left bundle branch block,   No previous EKG for comparison  No evidence of acute ischemia    Records Reviewed: Old Medical Records    Provider Notes (Medical Decision Making): Jcarlos Encarnacion is a 82 y.o. male presents for evaluation of epigastric and right upper quadrant pain. On arrival patient is afebrile, nontoxic-appearing and hemodynamically stable. Patient found to have mild abdominal distention and discomfort. CBC, CMP, lipase, EKG, troponin obtained. Patient is found to have a left bundle branch block, no previous EKG is available, this is felt to likely otherwise be old without any active chest pain at this time with negative troponin. CT abdomen pelvis revealed evidence of cholelithiasis without cholecystitis. Patient otherwise also with incidental left renal mass that is noted. On repeat evaluation patient has required multiple doses of morphine without significant improvement in his pain. Discussed with Dr. Angelica Aquino of general surgery who will evaluate the patient for consideration of elective cholecystectomy tomorrow should his pain fail to improve. Patient will be admitted to the hospitalist for further pain control and monitoring. Updated patient and daughter on findings and plan. Procedures:  Procedures    ED Course:       Diagnosis and Disposition     CONSULT NOTE:   I spoke with Dr. Angelica Aquino   Specialty: gen surgery  Discussed pt's hx, disposition, and available diagnostic and imaging results. Reviewed care plans. Consulting physician agrees with plans as outlined. .   Written by Alexus Villaseñor MD    ADMISSION NOTE:  Patient is being admitted to the hospital by Dr. Ciaran Ruvalcaba. The results of their tests and reasons for their admission have been discussed with them and/or available family. They convey agreement and understanding for the need to be admitted and for their admission diagnosis. Written by Alexus Villaseñor MD    CONDITIONS ON ADMISSION:  Deep Vein Thrombosis is not present at the time of admission. Thrombosis is not present at the time of admission.  Urinary Tract Infection is not present at the time of admission. Pneumonia is not present at the time of admission. MRSA is not present at the time of admission. Wound infection is not present at the time of admission. Pressure Ulcer is not present at the time of admission. CLINICAL IMPRESSION    1. Abdominal pain, generalized    2. Calculus of gallbladder without cholecystitis without obstruction        Current Discharge Medication List        3. Follow-up Information    None       _______________________________      Please note that this dictation was completed with [employer], the computer voice recognition software. Quite often unanticipated grammatical, syntax, homophones, and other interpretive errors are inadvertently transcribed by the computer software. Please disregard these errors. Please excuse any errors that have escaped final proofreading.

## 2021-10-24 NOTE — PROGRESS NOTES
1631-Report received awaiting pt.    1907-Verbal shift change report given to [facility] [place] [place] by Mallory Gutierrez RN. Report included the following information SBAR, Kardex, Summary, Intake/Output and MAR.

## 2021-10-24 NOTE — H&P
History & Physical    Patient: Johnny Mae MRN: [id number]  CSN: [id number]    YOB: 1935  Age: 82 y.o. Sex: male      DOA: 10/24/2021  Primary Care Provider:  Reinaldo Mahajan MD      Assessment/Plan     Patient Active Problem List   Diagnosis Code    Abdominal pain R10.9    Calculus of gallbladder and bile duct without cholecystitis K80.70    Renal mass N28.89    Hematuria R31.9    Nausea and vomiting R11.2    HTN (hypertension) I10    Hyperlipemia E78.5     Admitted to general medical floor    Abdominal pain: Possible secondary to his cholelithiasis vs renal mass. NPO. IVF. Morphine prn for pain  CT abd/pelvic IMPRESSION     1. Cholelithiasis without evidence of cholecystitis.     2. Left inguinal hernia containing a portion of the sigmoid colon. No evidence  of bowel obstruction.     3. Multiple colonic diverticula without diverticulitis.     4. Probable hepatic steatosis.     5. Imaging stigmata in keeping with ankylosing spondylitis. No superimposed  acute osseous abnormality. Advanced bilateral hip joint arthritis.     6. Multiple bilateral renal cysts with indeterminate posterior interpolar left  renal structure. If further characterization is required, nonemergent/routine  renal protocol CT follow-up could be considered. Cholelithiasis: Per CT. + abdominal pain and nausea with no acute cholecystitis. Consult to general surgery. HTN: Amlodipine PO. Pain control. Monitor BP closely    Left Renal mass with hematuria: per CT. May get renal protocol CT when pain is under the control. May consider consult to urology inpatient vs outpatient.      N/V: Zofran prn    DVT/GI prophylaixs    Supportive care      Full code    Estimated length of stay: 2-3 days    CC: abdominal pain for 2 days, worsening with N/V since this am       HPI:     Johnny Mae is a 82 y.o. male Vincentian-speaking, with past medical history of hypertension, hyperlipidemia who was brought by EMS today for 2 days history of constant abdominal pain, worsening with nausea vomiting since this morning. Patient states that his pain located to right upper abdominal, sharp, 7 out of 10 with no radiation. It was associated with abdominal bloating, nausea and dry heaves since this morning. He also constipated with a small bowel movement early this morning. No fevers chills. No bloody diarrhea. No cough no chest pain, no shortness of breath. He has not been able to eat since last night for which EMS was called and brought him to the ED. At the ED, work-up reviewed with gallbladder stone without acute Cholecystitis, he still in pain with IV morphine, his blood pressure elevated >193/90. Given his advanced age, a hospital admission was requested for further evaluation and treatment. He is fully vaccinated with Covid vaccine. Denies recent exposure to the Covid. History is obtained via a     No past medical history on file. No past surgical history on file. No family history on file. Social History     Socioeconomic History    Marital status:      Spouse name: Not on file    Number of children: Not on file    Years of education: Not on file    Highest education level: Not on file   Tobacco Use    Smoking status: Former Smoker   Substance and Sexual Activity    Alcohol use: No     Social Determinants of Health     Financial Resource Strain:     Difficulty of Paying Living Expenses:    Food Insecurity:     Worried About Running Out of Food in the Last Year:     [address] in the Last Year:    Transportation Needs:     Lack of Transportation (Medical):      Lack of Transportation (Non-Medical):    Physical Activity:     Days of Exercise per Week:     Minutes of Exercise per Session:    Stress:     Feeling of Stress :    Social Connections:     Frequency of Communication with Friends and Family:     Frequency of Social Gatherings with Friends and Family:     Attends [employer] Services:     Active Member of Clubs or Organizations:     Attends Club or Organization Meetings:     Marital Status:        Prior to Admission medications    Medication Sig Start Date End Date Taking? Authorizing Provider   atorvastatin (LIPITOR) 20 mg tablet Take 20 mg by mouth daily. Yes Provider, Historical   hydroCHLOROthiazide (HYDRODIURIL) 12.5 mg tablet Take 12.5 mg by mouth daily. Yes Provider, Historical   HYDROcodone-acetaminophen (NORCO) 5-325 mg per tablet Take 1-2 tablets PO every 4-6 hours as needed for pain control. If over the counter ibuprofen or acetaminophen was suggested, then only take the vicodin for pain not well controlled with the over the counter medication. 18   Hugo Tate MD       No Known Allergies    Review of Systems  Gen: No fever, chills, malaise, + weight loss. Heent: No headache, rhinorrhea, epistaxis, ear pain, hearing loss, sinus pain, neck pain/stiffness, sore throat. Heart: No chest pain, palpitations, CUTLER, pnd, or orthopnea. Resp: No cough, hemoptysis, wheezing and shortness of breath. GI: + nausea/ vomiting, No diarrhea, constipation, melena or hematochezia. + abdominal   : No urinary obstruction, dysuria. + hematuria. Derm: No rash, new skin lesion or pruritis. Musc/skeletal: no bone or joint complains. Vasc: No edema, cyanosis or claudication. Endo: No heat/cold intolerance, no polyuria,polydipsia or polyphagia. Neuro: No unilateral weakness, numbness, tingling. No seizures. Heme: No easy bruising or bleeding. Physical Exam:     Physical Exam:  Visit Vitals  BP (!) 193/72 (BP 1 Location: Left arm, BP Patient Position: Supine)   Pulse 68   Temp 99.2 °F (37.3 °C)   Resp 16   Wt 74.6 kg (164 lb 8 oz)   SpO2 100%   BMI 26.55 kg/m²      O2 Device: None (Room air)    Temp (24hrs), Av.7 °F (37.1 °C), Min:98.2 °F (36.8 °C), Max:99.2 °F (37.3 °C)    No intake/output data recorded.    No intake/output data recorded. General:  Awake, cooperative,in pain   Head:  Normocephalic, without obvious abnormality, atraumatic. Eyes:  Conjunctivae/corneas clear, sclera anicteric, PERRL, EOMs intact. Nose: Nares normal. No drainage or sinus tenderness. Throat: Lips, mucosa dry    Neck: Supple, symmetrical, trachea midline, no adenopathy. Lungs:   Clear to auscultation bilaterally. Heart:  Regular rate and rhythm, S1, S2 normal, no murmur, click, rub or gallop. Abdomen: + Distention. TTP diffusely. Bowel sounds normal.    Extremities: Extremities normal, atraumatic, no cyanosis or edema. Capillary refill normal..   Pulses: 2+ and symmetric all extremities. Skin: Skin flushed, turgor normal. No rashes or lesions   Neurologic: CNII-XII intact. No focal motor or sensory deficit. Labs Reviewed:      Recent Results (from the past 24 hour(s))   CBC WITH AUTOMATED DIFF    Collection Time: 10/24/21 11:42 AM   Result Value Ref Range    WBC 11.1 4.6 - 13.2 K/uL    RBC 5.14 4.35 - 5.65 M/uL    HGB 15.8 13.0 - 16.0 g/dL    HCT 47.3 36.0 - 48.0 %    MCV 92.0 78.0 - 100.0 FL    MCH 30.7 24.0 - 34.0 PG    MCHC 33.4 31.0 - 37.0 g/dL    RDW 13.4 11.6 - 14.5 %    PLATELET 766 724 - 485 K/uL    MPV 10.5 9.2 - 11.8 FL    NEUTROPHILS 78 (H) 40 - 73 %    LYMPHOCYTES 12 (L) 21 - 52 %    MONOCYTES 9 3 - 10 %    EOSINOPHILS 0 0 - 5 %    BASOPHILS 1 0 - 2 %    ABS. NEUTROPHILS 8.6 (H) 1.8 - 8.0 K/UL    ABS. LYMPHOCYTES 1.3 0.9 - 3.6 K/UL    ABS. MONOCYTES 1.0 0.05 - 1.2 K/UL    ABS. EOSINOPHILS 0.0 0.0 - 0.4 K/UL    ABS.  BASOPHILS 0.1 0.0 - 0.1 K/UL    DF AUTOMATED     METABOLIC PANEL, COMPREHENSIVE    Collection Time: 10/24/21 11:42 AM   Result Value Ref Range    Sodium 137 136 - 145 mmol/L    Potassium 3.9 3.5 - 5.5 mmol/L    Chloride 103 100 - 111 mmol/L    CO2 29 21 - 32 mmol/L    Anion gap 5 3.0 - 18 mmol/L    Glucose 142 (H) 74 - 99 mg/dL    BUN 19 (H) 7.0 - 18 MG/DL    Creatinine 0.63 0.6 - 1.3 MG/DL    BUN/Creatinine ratio 30 (H) 12 - 20      GFR est AA >60 >60 ml/min/1.73m2    GFR est non-AA >60 >60 ml/min/1.73m2    Calcium 9.0 8.5 - 10.1 MG/DL    Bilirubin, total 1.1 (H) 0.2 - 1.0 MG/DL    ALT (SGPT) 52 16 - 61 U/L    AST (SGOT) 46 (H) 10 - 38 U/L    Alk.  phosphatase 116 45 - 117 U/L    Protein, total 7.6 6.4 - 8.2 g/dL    Albumin 3.7 3.4 - 5.0 g/dL    Globulin 3.9 2.0 - 4.0 g/dL    A-G Ratio 0.9 0.8 - 1.7     LIPASE    Collection Time: 10/24/21 11:42 AM   Result Value Ref Range    Lipase 62 (L) 73 - 393 U/L   LACTIC ACID    Collection Time: 10/24/21 11:42 AM   Result Value Ref Range    Lactic acid 1.8 0.4 - 2.0 MMOL/L   TROPONIN I    Collection Time: 10/24/21 11:42 AM   Result Value Ref Range    Troponin-I, QT <0.02 0.0 - 0.045 NG/ML   URINALYSIS W/ RFLX MICROSCOPIC    Collection Time: 10/24/21 11:58 AM   Result Value Ref Range    Color YELLOW      Appearance TURBID      Specific gravity 1.022 1.005 - 1.030      pH (UA) 8.5 (H) 5.0 - 8.0      Protein 100 (A) NEG mg/dL    Glucose Negative NEG mg/dL    Ketone 15 (A) NEG mg/dL    Bilirubin Negative NEG      Blood LARGE (A) NEG      Urobilinogen 1.0 0.2 - 1.0 EU/dL    Nitrites Negative NEG      Leukocyte Esterase Negative NEG     URINE MICROSCOPIC ONLY    Collection Time: 10/24/21 11:58 AM   Result Value Ref Range    WBC 0 to 3 0 - 5 /hpf    RBC TOO NUMEROUS TO COUNT 0 - 5 /hpf    Epithelial cells FEW 0 - 5 /lpf    Bacteria NONE SEEN NEG /hpf    Mucus 1+ (A) NEG /lpf    Amorphous Crystals 2+ (A) NEG       Procedures/imaging: see electronic medical records for all procedures/Xrays and details which were not copied into this note but were reviewed prior to creation of Plan      CC: Isiah Singleton MD

## 2021-10-25 ENCOUNTER — APPOINTMENT (OUTPATIENT)
Dept: GENERAL RADIOLOGY | Age: 86
DRG: 263 | End: 2021-10-25
Attending: HOSPITALIST
Payer: MEDICAID

## 2021-10-25 ENCOUNTER — APPOINTMENT (OUTPATIENT)
Dept: ULTRASOUND IMAGING | Age: 86
DRG: 263 | End: 2021-10-25
Attending: SURGERY
Payer: MEDICAID

## 2021-10-25 ENCOUNTER — ANESTHESIA EVENT (OUTPATIENT)
Dept: SURGERY | Age: 86
DRG: 263 | End: 2021-10-25
Payer: MEDICAID

## 2021-10-25 ENCOUNTER — ANESTHESIA (OUTPATIENT)
Dept: SURGERY | Age: 86
DRG: 263 | End: 2021-10-25
Payer: MEDICAID

## 2021-10-25 PROBLEM — R93.2 ABNORMAL LIVER ULTRASOUND: Status: ACTIVE | Noted: 2021-10-25

## 2021-10-25 LAB
ATRIAL RATE: 56 BPM
CALCULATED P AXIS, ECG09: 40 DEGREES
CALCULATED R AXIS, ECG10: 9 DEGREES
CALCULATED T AXIS, ECG11: 114 DEGREES
DIAGNOSIS, 93000: NORMAL
P-R INTERVAL, ECG05: 160 MS
Q-T INTERVAL, ECG07: 464 MS
QRS DURATION, ECG06: 136 MS
QTC CALCULATION (BEZET), ECG08: 447 MS
VENTRICULAR RATE, ECG03: 56 BPM

## 2021-10-25 PROCEDURE — 71045 X-RAY EXAM CHEST 1 VIEW: CPT

## 2021-10-25 PROCEDURE — 77030022704 HC SUT VLOC COVD -B: Performed by: SURGERY

## 2021-10-25 PROCEDURE — 76210000006 HC OR PH I REC 0.5 TO 1 HR: Performed by: SURGERY

## 2021-10-25 PROCEDURE — 0FT44ZZ RESECTION OF GALLBLADDER, PERCUTANEOUS ENDOSCOPIC APPROACH: ICD-10-PCS | Performed by: SURGERY

## 2021-10-25 PROCEDURE — 77030018836 HC SOL IRR NACL ICUM -A: Performed by: SURGERY

## 2021-10-25 PROCEDURE — 77030010507 HC ADH SKN DERMBND J&J -B: Performed by: SURGERY

## 2021-10-25 PROCEDURE — 0FB04ZX EXCISION OF LIVER, PERCUTANEOUS ENDOSCOPIC APPROACH, DIAGNOSTIC: ICD-10-PCS | Performed by: SURGERY

## 2021-10-25 PROCEDURE — 88307 TISSUE EXAM BY PATHOLOGIST: CPT

## 2021-10-25 PROCEDURE — 77030006643: Performed by: ANESTHESIOLOGY

## 2021-10-25 PROCEDURE — 74011000250 HC RX REV CODE- 250: Performed by: NURSE ANESTHETIST, CERTIFIED REGISTERED

## 2021-10-25 PROCEDURE — 77030040504 HC DRN WND MDII -B: Performed by: SURGERY

## 2021-10-25 PROCEDURE — 77030002933 HC SUT MCRYL J&J -A: Performed by: SURGERY

## 2021-10-25 PROCEDURE — 77030031139 HC SUT VCRL2 J&J -A: Performed by: SURGERY

## 2021-10-25 PROCEDURE — 77030034154 HC SHR COAG HARM ACE J&J -F: Performed by: SURGERY

## 2021-10-25 PROCEDURE — 77030012770 HC TRCR OPT FX AMR -B: Performed by: SURGERY

## 2021-10-25 PROCEDURE — 76010000131 HC OR TIME 2 TO 2.5 HR: Performed by: SURGERY

## 2021-10-25 PROCEDURE — 77030003578 HC NDL INSUF VERES AMR -B: Performed by: SURGERY

## 2021-10-25 PROCEDURE — 77030002916 HC SUT ETHLN J&J -A: Performed by: SURGERY

## 2021-10-25 PROCEDURE — 74011000250 HC RX REV CODE- 250: Performed by: SURGERY

## 2021-10-25 PROCEDURE — 77030020829: Performed by: SURGERY

## 2021-10-25 PROCEDURE — 76705 ECHO EXAM OF ABDOMEN: CPT

## 2021-10-25 PROCEDURE — 74011250636 HC RX REV CODE- 250/636: Performed by: SURGERY

## 2021-10-25 PROCEDURE — 88313 SPECIAL STAINS GROUP 2: CPT

## 2021-10-25 PROCEDURE — 77030008683 HC TU ET CUF COVD -A: Performed by: ANESTHESIOLOGY

## 2021-10-25 PROCEDURE — 74011000258 HC RX REV CODE- 258: Performed by: SURGERY

## 2021-10-25 PROCEDURE — 2709999900 HC NON-CHARGEABLE SUPPLY: Performed by: SURGERY

## 2021-10-25 PROCEDURE — 77030027138 HC INCENT SPIROMETER -A

## 2021-10-25 PROCEDURE — 77030010030: Performed by: SURGERY

## 2021-10-25 PROCEDURE — 77030012799 HC TRCR GELPRT BLN AMR -B: Performed by: SURGERY

## 2021-10-25 PROCEDURE — 74011250636 HC RX REV CODE- 250/636: Performed by: NURSE ANESTHETIST, CERTIFIED REGISTERED

## 2021-10-25 PROCEDURE — 77010033678 HC OXYGEN DAILY

## 2021-10-25 PROCEDURE — 77030018875 HC APPL CLP LIG4 J&J -B: Performed by: SURGERY

## 2021-10-25 PROCEDURE — 74011250637 HC RX REV CODE- 250/637: Performed by: FAMILY MEDICINE

## 2021-10-25 PROCEDURE — 65270000029 HC RM PRIVATE

## 2021-10-25 PROCEDURE — 77030040506 HC DRN WND MDII -A: Performed by: SURGERY

## 2021-10-25 PROCEDURE — 77030008477 HC STYL SATN SLP COVD -A: Performed by: ANESTHESIOLOGY

## 2021-10-25 PROCEDURE — 76060000035 HC ANESTHESIA 2 TO 2.5 HR: Performed by: SURGERY

## 2021-10-25 PROCEDURE — 77030008574 HC TBNG SUC IRR STRY -B: Performed by: SURGERY

## 2021-10-25 PROCEDURE — 77030009403 HC ELECTRD ENDO MEGA -B: Performed by: SURGERY

## 2021-10-25 PROCEDURE — 77030008608 HC TRCR ENDOSC SMTH AMR -B: Performed by: SURGERY

## 2021-10-25 PROCEDURE — 74011250636 HC RX REV CODE- 250/636: Performed by: FAMILY MEDICINE

## 2021-10-25 PROCEDURE — 88304 TISSUE EXAM BY PATHOLOGIST: CPT

## 2021-10-25 RX ORDER — MORPHINE SULFATE 2 MG/ML
1 INJECTION, SOLUTION INTRAMUSCULAR; INTRAVENOUS
Status: DISCONTINUED | OUTPATIENT
Start: 2021-10-25 | End: 2021-10-26

## 2021-10-25 RX ORDER — NALOXONE HYDROCHLORIDE 0.4 MG/ML
0.1 INJECTION, SOLUTION INTRAMUSCULAR; INTRAVENOUS; SUBCUTANEOUS
Status: DISCONTINUED | OUTPATIENT
Start: 2021-10-25 | End: 2021-10-25 | Stop reason: HOSPADM

## 2021-10-25 RX ORDER — FENTANYL CITRATE 50 UG/ML
INJECTION, SOLUTION INTRAMUSCULAR; INTRAVENOUS AS NEEDED
Status: DISCONTINUED | OUTPATIENT
Start: 2021-10-25 | End: 2021-10-25 | Stop reason: HOSPADM

## 2021-10-25 RX ORDER — DEXTROSE 50 % IN WATER (D50W) INTRAVENOUS SYRINGE
25-50 AS NEEDED
Status: DISCONTINUED | OUTPATIENT
Start: 2021-10-25 | End: 2021-10-25 | Stop reason: HOSPADM

## 2021-10-25 RX ORDER — LIDOCAINE HYDROCHLORIDE 20 MG/ML
INJECTION, SOLUTION EPIDURAL; INFILTRATION; INTRACAUDAL; PERINEURAL AS NEEDED
Status: DISCONTINUED | OUTPATIENT
Start: 2021-10-25 | End: 2021-10-25 | Stop reason: HOSPADM

## 2021-10-25 RX ORDER — OXYCODONE HYDROCHLORIDE 5 MG/1
5 TABLET ORAL
Status: DISCONTINUED | OUTPATIENT
Start: 2021-10-25 | End: 2021-10-28 | Stop reason: HOSPADM

## 2021-10-25 RX ORDER — CEFAZOLIN SODIUM/WATER 2 G/20 ML
2 SYRINGE (ML) INTRAVENOUS ONCE
Status: COMPLETED | OUTPATIENT
Start: 2021-10-25 | End: 2021-10-25

## 2021-10-25 RX ORDER — ONDANSETRON 2 MG/ML
4 INJECTION INTRAMUSCULAR; INTRAVENOUS
Status: DISCONTINUED | OUTPATIENT
Start: 2021-10-25 | End: 2021-10-28

## 2021-10-25 RX ORDER — PROPOFOL 10 MG/ML
INJECTION, EMULSION INTRAVENOUS AS NEEDED
Status: DISCONTINUED | OUTPATIENT
Start: 2021-10-25 | End: 2021-10-25 | Stop reason: HOSPADM

## 2021-10-25 RX ORDER — ONDANSETRON 2 MG/ML
INJECTION INTRAMUSCULAR; INTRAVENOUS AS NEEDED
Status: DISCONTINUED | OUTPATIENT
Start: 2021-10-25 | End: 2021-10-25 | Stop reason: HOSPADM

## 2021-10-25 RX ORDER — BUPIVACAINE HYDROCHLORIDE AND EPINEPHRINE 5; 5 MG/ML; UG/ML
INJECTION, SOLUTION EPIDURAL; INTRACAUDAL; PERINEURAL AS NEEDED
Status: DISCONTINUED | OUTPATIENT
Start: 2021-10-25 | End: 2021-10-25 | Stop reason: HOSPADM

## 2021-10-25 RX ORDER — SODIUM CHLORIDE, SODIUM LACTATE, POTASSIUM CHLORIDE, CALCIUM CHLORIDE 600; 310; 30; 20 MG/100ML; MG/100ML; MG/100ML; MG/100ML
50 INJECTION, SOLUTION INTRAVENOUS CONTINUOUS
Status: DISCONTINUED | OUTPATIENT
Start: 2021-10-25 | End: 2021-10-25 | Stop reason: HOSPADM

## 2021-10-25 RX ORDER — MAGNESIUM SULFATE 100 %
4 CRYSTALS MISCELLANEOUS AS NEEDED
Status: DISCONTINUED | OUTPATIENT
Start: 2021-10-25 | End: 2021-10-25 | Stop reason: HOSPADM

## 2021-10-25 RX ORDER — SODIUM CHLORIDE, SODIUM LACTATE, POTASSIUM CHLORIDE, CALCIUM CHLORIDE 600; 310; 30; 20 MG/100ML; MG/100ML; MG/100ML; MG/100ML
50 INJECTION, SOLUTION INTRAVENOUS CONTINUOUS
Status: DISCONTINUED | OUTPATIENT
Start: 2021-10-25 | End: 2021-10-28

## 2021-10-25 RX ORDER — ONDANSETRON 2 MG/ML
4 INJECTION INTRAMUSCULAR; INTRAVENOUS ONCE
Status: DISCONTINUED | OUTPATIENT
Start: 2021-10-25 | End: 2021-10-25 | Stop reason: HOSPADM

## 2021-10-25 RX ORDER — SODIUM CHLORIDE, SODIUM LACTATE, POTASSIUM CHLORIDE, CALCIUM CHLORIDE 600; 310; 30; 20 MG/100ML; MG/100ML; MG/100ML; MG/100ML
125 INJECTION, SOLUTION INTRAVENOUS CONTINUOUS
Status: DISCONTINUED | OUTPATIENT
Start: 2021-10-25 | End: 2021-10-25

## 2021-10-25 RX ORDER — INSULIN LISPRO 100 [IU]/ML
INJECTION, SOLUTION INTRAVENOUS; SUBCUTANEOUS ONCE
Status: DISCONTINUED | OUTPATIENT
Start: 2021-10-25 | End: 2021-10-25 | Stop reason: HOSPADM

## 2021-10-25 RX ORDER — FENTANYL CITRATE 50 UG/ML
25 INJECTION, SOLUTION INTRAMUSCULAR; INTRAVENOUS
Status: DISCONTINUED | OUTPATIENT
Start: 2021-10-25 | End: 2021-10-25 | Stop reason: HOSPADM

## 2021-10-25 RX ORDER — HYDROMORPHONE HYDROCHLORIDE 1 MG/ML
0.5 INJECTION, SOLUTION INTRAMUSCULAR; INTRAVENOUS; SUBCUTANEOUS
Status: DISCONTINUED | OUTPATIENT
Start: 2021-10-25 | End: 2021-10-25 | Stop reason: HOSPADM

## 2021-10-25 RX ORDER — OXYCODONE AND ACETAMINOPHEN 5; 325 MG/1; MG/1
1 TABLET ORAL AS NEEDED
Status: DISCONTINUED | OUTPATIENT
Start: 2021-10-25 | End: 2021-10-25 | Stop reason: HOSPADM

## 2021-10-25 RX ORDER — ROCURONIUM BROMIDE 10 MG/ML
INJECTION, SOLUTION INTRAVENOUS AS NEEDED
Status: DISCONTINUED | OUTPATIENT
Start: 2021-10-25 | End: 2021-10-25 | Stop reason: HOSPADM

## 2021-10-25 RX ADMIN — AMLODIPINE BESYLATE 5 MG: 5 TABLET ORAL at 10:10

## 2021-10-25 RX ADMIN — PROPOFOL 150 MG: 10 INJECTION, EMULSION INTRAVENOUS at 15:18

## 2021-10-25 RX ADMIN — MORPHINE SULFATE 4 MG: 4 INJECTION INTRAVENOUS at 01:46

## 2021-10-25 RX ADMIN — PIPERACILLIN AND TAZOBACTAM 3.38 G: 3; .375 INJECTION, POWDER, LYOPHILIZED, FOR SOLUTION INTRAVENOUS at 19:01

## 2021-10-25 RX ADMIN — SODIUM CHLORIDE 100 ML/HR: 900 INJECTION, SOLUTION INTRAVENOUS at 05:39

## 2021-10-25 RX ADMIN — ROCURONIUM BROMIDE 10 MG: 10 INJECTION, SOLUTION INTRAVENOUS at 16:19

## 2021-10-25 RX ADMIN — LIDOCAINE HYDROCHLORIDE 80 MG: 20 INJECTION, SOLUTION INTRAVENOUS at 15:18

## 2021-10-25 RX ADMIN — CEFAZOLIN 2 G: 1 INJECTION, POWDER, FOR SOLUTION INTRAVENOUS at 15:12

## 2021-10-25 RX ADMIN — ONDANSETRON HYDROCHLORIDE 4 MG: 2 INJECTION INTRAMUSCULAR; INTRAVENOUS at 15:20

## 2021-10-25 RX ADMIN — ROCURONIUM BROMIDE 30 MG: 10 INJECTION, SOLUTION INTRAVENOUS at 15:18

## 2021-10-25 RX ADMIN — SODIUM CHLORIDE, SODIUM LACTATE, POTASSIUM CHLORIDE, AND CALCIUM CHLORIDE 100 ML/HR: 600; 310; 30; 20 INJECTION, SOLUTION INTRAVENOUS at 19:00

## 2021-10-25 RX ADMIN — FENTANYL CITRATE 50 MCG: 50 INJECTION, SOLUTION INTRAMUSCULAR; INTRAVENOUS at 16:04

## 2021-10-25 RX ADMIN — MORPHINE SULFATE 4 MG: 4 INJECTION INTRAVENOUS at 05:39

## 2021-10-25 RX ADMIN — SUGAMMADEX 100 MG: 100 INJECTION, SOLUTION INTRAVENOUS at 17:05

## 2021-10-25 RX ADMIN — FENTANYL CITRATE 100 MCG: 50 INJECTION, SOLUTION INTRAMUSCULAR; INTRAVENOUS at 15:18

## 2021-10-25 RX ADMIN — FENTANYL CITRATE 50 MCG: 50 INJECTION, SOLUTION INTRAMUSCULAR; INTRAVENOUS at 15:39

## 2021-10-25 RX ADMIN — ONDANSETRON 8 MG: 2 INJECTION INTRAMUSCULAR; INTRAVENOUS at 05:39

## 2021-10-25 RX ADMIN — SODIUM CHLORIDE, SODIUM LACTATE, POTASSIUM CHLORIDE, AND CALCIUM CHLORIDE 125 ML/HR: 600; 310; 30; 20 INJECTION, SOLUTION INTRAVENOUS at 14:40

## 2021-10-25 NOTE — PROGRESS NOTES
Reason for Admission:  Cholelithiasis                     RUR Score:   Low; 7%              Plan for utilizing home health:   TBD       PCP: First and Last name:  Caro Charles MD     Name of Practice:    Are you a current patient: Yes/No:    Approximate date of last visit:    Can you participate in a virtual visit with your PCP:                     Current Advanced Directive/Advance Care Plan: No Order      Healthcare Decision Maker:   Click here to complete [address] including selection of the Healthcare Decision Maker Relationship (ie \"Primary\")             Primary Decision Maker: [facility] - Daughter - [phone]                  Transition of Care Plan:    Home with family support and personal care and physician follow up vs  with family support and personal care and physician follow up                   Chart reviewed. Per H&P \"The patient is an 82-year-old Azerbaijani-speaking male (history obtained with help of daughter at bedside), with a past medical history of hypertension, admitted to the hospitalist service at [facility] Emergency Room with a two-day history (daughter believes he had a single similar, milder episode around 2 weeks ago) of increasingly severe right upper quadrant pain, which became associated with multiple episodes of nausea and vomiting, starting today. He denies any previous similar episodes. He has had some associated abdominal bloating and constipation, but no diarrhea, no bloody bowel movements. Denies any fever or chills. Denies any chest pain, shortness of breath, or other symptoms at this time. \"    Pt is currently off the floor. CM met with pt's daughter, Lizbeth Avalos ([phone]), at bedside at bedside to discuss transition of care. Pt lives with his daughter and receives personal care through [facility]. Pt's daughter is open to [facility] if needed.   Please consider ordering therapy services following surgical intervention when appropriate to assist with transition of care.   CM to continue to follow and assist.    Care Management Interventions  Transition of Care Consult (CM Consult): Discharge Planning  Health Maintenance Reviewed: Yes  Support Systems: Child(leno), Caregiver/Home Care Staff  The Plan for Transition of Care is Related to the Following Treatment Goals : Home with family support and personal care and physician follow up vs HH with family support and personal care and physician follow up            Discharge Location  Discharge Placement: Home with family assistance (Home with family support and personal care and physician follow up vs  with family support and personal care and physician follow up          )

## 2021-10-25 NOTE — H&P (VIEW-ONLY)
[id number] [facility]    Name:  Clary Kehr  MR#:   [id number]  :  1935  ACCOUNT #:  [de-identified]  DATE OF SERVICE:  10/24/2021    GENERAL SURGERY CONSULTATION AND PREOPERATIVE NOTE    ADMISSION DIAGNOSES:  1. Symptomatic cholelithiasis, possible cholecystitis. 2.  Abdominal pain, unable to be treated as an outpatient. 3.  Nausea and vomiting, unable to be treated as an outpatient. 4.  Elevated liver transaminases. 5.  Abnormal liver CT. HISTORY OF PRESENT ILLNESS:  The patient is an 79-year-old Arabic-speaking male (history obtained with help of daughter at bedside), with a past medical history of hypertension, admitted to the hospitalist service at [facility] Emergency Room with a two-day history (daughter believes he had a single similar, milder episode around 2 weeks ago) of increasingly severe right upper quadrant pain, which became associated with multiple episodes of nausea and vomiting, starting today. He denies any previous similar episodes. He has had some associated abdominal bloating and constipation, but no diarrhea, no bloody bowel movements. Denies any fever or chills. Denies any chest pain, shortness of breath, or other symptoms at this time. PAST MEDICAL HISTORY:  1. Hypertension. 2.  Hyperlipidemia. PAST SURGICAL HISTORY:  Denies. MEDICATIONS:  Please see med rec sheet. I do not see that he is on any antiplatelet or anticoagulation medications. ALLERGIES:  NO KNOWN DRUG ALLERGIES. SOCIAL HISTORY:  He denies smoking. Rarely drinks alcohol,. Does not use any illicit drugs. FAMILY HISTORY:  Noncontributory. REVIEW OF SYSTEMS:  A 12-point review of system was reviewed with the patient, negative apart from that listed in the HPI. PHYSICAL EXAMINATION:  GENERAL:  He is well developed, well nourished, in no acute distress.   VITAL SIGNS:  He has been afebrile since arrival.  Temperature on admission is 98.2, last temperature taken 99.4.  He has been hypertensive also since admission. Last blood pressure taken 158/70, pulse at that time 72, respirations 16, sating 96% on room air. HEENT:  Normocephalic, atraumatic. Pupils equal, round, reactive to light and accommodation. Extraocular movements intact. Sclerae anicteric. NECK:  Supple. No JVD. LUNGS:  Clear to auscultation. No accessory muscle use. CARDIOVASCULAR:  Regular rate and rhythm. ABDOMEN:  Soft, minimally distended. Bowel sounds are present. He is diffusely tender, but does have more pronounced localized tenderness and guarding with positive Daugherty sign in the right upper quadrant. No palpable umbilical hernia. No visible surgical scars. He does have a reducible, nontender left inguinal hernia. RECTAL:  Deferred. EXTREMITIES:  No clubbing or cyanosis. Good capillary refill. No calf tenderness. ANCILLARY STUDIES:  Labs on admission; white count is normal at 11.1, H and H 15.8 and 47.3, platelets 443, slight neutrophil shift of 78. BMP essentially normal apart from minimally elevated glucose at 142; total bilirubin is minimally elevated at 1.1, the last taken in 2015 was 0.8; AST elevated at 46; ALT normal.  Lipase not elevated at 62, lactic acid is not elevated, alkaline phosphatase is normal at 116. RADIOLOGY:  CT of the abdomen and pelvis with IV contrast only showed diffuse hepatic hypoattenuation consistent with hepatic steatosis, showed small gallstones within a nondistended and non-inflamed-appearing gallbladder. No significant pancreas findings. Incidental note of a left renal lesion located posteriorly, with recommendations to proceed with a dedicated renal CT. Also incidentally noted left inguinal hernia continued non-obstructed sigmoid colon with diverticulosis without diverticulitis. Otherwise, there are no significant findings on exam.    ASSESSMENT:  An 77-year-old  male with,  1. Cholelithiasis, possible cholecystitis.   2.  Elevated liver transaminases. 3.  Abnormal liver CT. 4.  Abdominal pain, not able to be treated outpatient. 5.  Nausea and vomiting, not able to be treated outpatient. PLAN:  The patient is admitted to the hospitalist service. No need for IV antibiotics at this time. He is to be made n.p.o. after midnight. I will complete the radiologic evaluation of the gallbladder and biliary tree with a formal right upper quadrant ultrasound, as long as the patient's blood pressure can be adequately controlled, and medical doctors find no reason the patient cannot proceed with general anesthesia for surgery. Would recommend proceeding to the operating room tomorrow. I discussed with the patient the nature of the surgery, alternatives, benefits, and risks. The surgery will be laparoscopic cholecystectomy as well as liver wedge biopsy to address the radiologic findings and elevated liver transaminase. The risks of the procedure include, but are not limited to, medication reaction, anesthesia complications, bleeding, infection, possible need to convert to an open procedure, inadvertant injury to intraabdominal structures requiring additional surgery, retained or de salvador common duct stones requiring endoscopy or additional intervention, injury to bile duct requiring additional surgery or other intervention, postoperative fluid collection requiring drainage, possible need to place an operative drain, postoperative pancreatitis, incisional hernia, poor wound healing postoperatively, persistence of symptoms despite surgery, etc.  The patient understands these risks and was willing to give informed consent to proceed with surgery. Kathleen Martell MD      [email or web address]  D:  10/25/2021 0:18  T:  10/25/2021 4:30  JOB #:  [id number]  CC:  Rashi Hunter MD

## 2021-10-25 NOTE — PROGRESS NOTES
1018-Placed ultrasound for stat as requested by MD Post informed him daughter at bedside speaks [address] 1155-Pt off floor for procedure. 1406-Called report to Bowen Blackman in Pre-OP. Pt been NPO since I started shift pt only had BP medication. Was NPO with Ice before MD made him NPO.    1745-Received report from PACU asked them to get pt a diet order. 1910-Verbal shift change report given to [address]ong 917 by Luis Alberto Hogan RN. Report included the following information SBAR, Kardex, Summary, Intake/Output and MAR.

## 2021-10-25 NOTE — ANESTHESIA POSTPROCEDURE EVALUATION
Post-Anesthesia Evaluation and Assessment    Cardiovascular Function/Vital Signs  Visit Vitals  BP (!) 156/66   Pulse 95   Temp 36.2 °C (97.1 °F)   Resp 23   Ht 5' 6\" (1.676 m)   Wt 74.6 kg (164 lb 8 oz)   SpO2 95%   BMI 26.55 kg/m²       Patient is status post Procedure(s):  LAPAROSCOPIC CHOLECYSTECTOMY WITH LIVER BIOPSY. Nausea/Vomiting: Controlled. Postoperative hydration reviewed and adequate. Pain:  Pain Scale 1: (P) Numeric (0 - 10) (10/25/21 1738)  Pain Intensity 1: (P) 0 (10/25/21 1738)   Managed. Neurological Status:   Neuro (WDL): Exceptions to WDL (10/25/21 1720)   At baseline. Mental Status and Level of Consciousness: Baseline and appropriate for discharge. Pulmonary Status:   O2 Device: (P) Nasal cannula (10/25/21 1738)   Adequate oxygenation and airway patent. Complications related to anesthesia: None    Post-anesthesia assessment completed. No concerns. Patient has met all discharge requirements.     Signed By: Lizabeth Garcia MD    October 25, 2021

## 2021-10-25 NOTE — BRIEF OP NOTE
Brief Postoperative Note    Patient: Allison Shafer  YOB: 1935  MRN: [id number]    Date of Procedure: 10/25/2021     Pre-Op Diagnosis: CHOLELITHIASIS, ELEVATED LIVER TRANSAMINASE, ABNORMAL LIVER ULTRASOUND    Post-Op Diagnosis: CHOLECYSTITIS/CHOLELITHIASIS, ELEVATED LIVER TRANSAMINASE, ABNORMAL LIVER ULTRASOUND      Procedure(s):  LAPAROSCOPIC CHOLECYSTECTOMY (MOD 22), LIVER WEDGE BIOPSY    Surgeon(s):  Darron Wilkins MD    Surgical Assistant: None    Anesthesia: General     Estimated Blood Loss (mL): less than 798     Complications: None    Specimens:   ID Type Source Tests Collected by Time Destination   1 : GALLBLADDER AND CONTENTS Preservative Gallbladder  Post, Ny Rea MD 10/25/2021 1651 Pathology   2 : [facility] LIVER BIOPSY Preservative Liver  Darron Wilkins MD 10/25/2021 1537 Pathology        Implants: * No implants in log *    Drains:   Amador-Roche Drain 10/25/21 Right;Mid Abdomen (Active)   Site Assessment Clean, dry, & intact 10/25/21 1724   Dressing Status Clean, dry, & intact 10/25/21 1724   Status Charged; Patent 10/25/21 1724   Drainage Color Serosanguinous 10/25/21 1724       Findings: SEVERE CHOLECYSTITIS    Electronically Signed by Chani Sykes MD on 10/25/2021 at 5:47 PM    Op note dictated #[id number]  RP

## 2021-10-25 NOTE — PROGRESS NOTES
Hospitalist Progress Note    Patient: Mendel Newton MRN: [id number]  CSN: [id number]    YOB: 1935  Age: 82 y.o. Sex: male    DOA: 10/24/2021 LOS:  LOS: 1 day                Assessment/Plan     Patient Active Problem List   Diagnosis Code    Abdominal pain R10.9    Calculus of gallbladder and bile duct without cholecystitis K80.70    Renal mass N28.89    Hematuria R31.9    Nausea and vomiting R11.2    HTN (hypertension) I10    Hyperlipemia E78.5    Abnormal liver ultrasound R93.2        Chief complaint :  82 y.o. male Kiswahili-speaking, with past medical history of hypertension, hyperlipidemia who was brought by EMS today for 2 days history of constant abdominal pain, N/V. Abdominal pain -   Possible secondary to his cholelithiasis +/- cholecystitis . NPO,  IVF. Morphine prn for pain  Seen by general surgery  Plan for lap mirtha    Pre op eval -  Discussed with patient and daughter at bedside. No history of heart disease. He walks around his neighborhood, no chest pain or SOB. His EKG with LBBB, no change from EKG in 2015,  CXR with no acute cardiopulmonary abnormality. Low risk for surgery.     HTN -   Amlodipine PO. Pain control. Monitor BP closely     Left Renal mass with hematuria -   Need to follow up with urology . Disposition : TBD    Review of systems  General: No fevers or chills. Cardiovascular: No chest pain or pressure. No palpitations. Pulmonary: No shortness of breath. Gastrointestinal: see above. Physical Exam:  General: Awake, cooperative, no acute distress    HEENT: NC, Atraumatic. PERRLA, anicteric sclerae. Lungs: CTA Bilaterally. No Wheezing/Rhonchi/Rales. Heart:  S1 S2,  No murmur, No Rubs, No Gallops  Abdomen: Soft, Non distended, RUQ tender.  +Bowel sounds,   Extremities: No c/c/e  Psych:   Not anxious or agitated. Neurologic:  No acute neurological deficit.            Vital signs/Intake and Output:  Visit Vitals  BP (!) 143/68   Pulse (!) 102 Temp 97.1 °F (36.2 °C)   Resp 29   Ht 5' 6\" (1.676 m)   Wt 74.6 kg (164 lb 8 oz)   SpO2 97%   BMI 26.55 kg/m²     Current Shift:  10/25 0701 - 10/25 1900  In: 1000 [I.V.:1000]  Out: -   Last three shifts:  No intake/output data recorded. Labs: Results:       Chemistry Recent Labs     10/24/21  1142   *      K 3.9      CO2 29   BUN 19*   CREA 0.63   CA 9.0   AGAP 5   BUCR 30*      TP 7.6   ALB 3.7   GLOB 3.9   AGRAT 0.9      CBC w/Diff Recent Labs     10/24/21  1142   WBC 11.1   RBC 5.14   HGB 15.8   HCT 47.3      GRANS 78*   LYMPH 12*   EOS 0      Cardiac Enzymes No results for input(s): CPK, CKND1, MOHSEN in the last 72 hours. No lab exists for component: CKRMB, TROIP   Coagulation No results for input(s): PTP, INR, APTT, INREXT in the last 72 hours. Lipid Panel No results found for: CHOL, CHOLPOCT, CHOLX, CHLST, CHOLV, 267908, HDL, HDLP, LDL, LDLC, DLDLP, 943368, VLDLC, VLDL, TGLX, TRIGL, TRIGP, TGLPOCT, CHHD, CHHDX   BNP No results for input(s): BNPP in the last 72 hours.    Liver Enzymes Recent Labs     10/24/21  1142   TP 7.6   ALB 3.7         Thyroid Studies No results found for: T4, T3U, TSH, TSHEXT     Procedures/imaging: see electronic medical records for all procedures/Xrays and details which were not copied into this note but were reviewed prior to creation of Plan

## 2021-10-25 NOTE — ANESTHESIA PREPROCEDURE EVALUATION
Relevant Problems   No relevant active problems       Anesthetic History     PONV          Review of Systems / Medical History  Patient summary reviewed, nursing notes reviewed and pertinent labs reviewed    Pulmonary  Within defined limits            Pertinent negatives: No COPD, asthma and sleep apnea     Neuro/Psych   Within defined limits        Pertinent negatives: No seizures and CVA   Cardiovascular    Hypertension            Pertinent negatives: No past MI and CHF       GI/Hepatic/Renal  Within defined limits           Pertinent negatives: No liver disease and renal disease   Endo/Other  Within defined limits        Pertinent negatives: No diabetes   Other Findings              Physical Exam    Airway  Mallampati: II  TM Distance: 4 - 6 cm  Neck ROM: normal range of motion   Mouth opening: Normal     Cardiovascular    Rhythm: regular  Rate: normal         Dental    Dentition: Edentulous     Pulmonary  Breath sounds clear to auscultation               Abdominal  GI exam deferred       Other Findings            Anesthetic Plan    ASA: 2  Anesthesia type: general          Induction: Intravenous  Anesthetic plan and risks discussed with: Patient

## 2021-10-25 NOTE — PROGRESS NOTES
1915 Assumed patient care at this time. Report received from [facility], RN.   2115 Assessment completed. PRN morphine administered for severe pain. 2320 PRN Zofran administered for nausea. 0146 PRN morphine administered for severe pain. 0539 PRN morphine administered for severe pain. PRN Zofran administered for nausea.     0701 Bedside and verbal shift change report given to Michael Leigh (oncoming nurse) by Patricio Melgar RN (offgoing nurse). Report included the following information: SBAR, Kardex, MAR, and recent results.

## 2021-10-25 NOTE — PROGRESS NOTES
Comprehensive Nutrition Assessment    Type and Reason for Visit: Initial, Positive nutrition screen (unsure of weight loss; decreased appetite)    Nutrition Recommendations/Plan: Diet: advance diet past NPO and clear liquids per MD to meet nutritional needs. Will order ensure enlive TID when diet is advanced. Nutrition Assessment:  PMHx: htn, hyperlipidemia. Admited w/ Abdominal pain possible secondary to his cholelithiasis vs renal mass, N/V. Patient has been NPO since admission. Estimated Daily Nutrient Needs:  Energy (kcal): 1394; Weight Used for Energy Requirements: Current  Protein (g): 75; Weight Used for Protein Requirements: Current (1g)  Fluid (ml/day): 1865; Method Used for Fluid Requirements: 1 ml/kcal    Nutrition Related Findings:  (P) Lab and meds reviewed- glucose 142. No BM noted. No pressure injuries. Patient weight hx was reviewed. Most recent weight was 160 lb in 6/2018. No other weight was available; no weight loss since 2018. Visit patient and family member was at bedside. Stated lost about 4-5 lb in about 3 months. Stated appetite has been okay- no significant changes. Was agreeable to ensure enlive TID when diet is advanced. Will monitor diet advancement. Wounds:    None       Current Nutrition Therapies:  DIET NPO Sips of Water with Meds    Anthropometric Measures:  Height:  5' 6\" (167.6 cm)  Current Body Wt:  74.4 kg (164 lb)   Admission Body Wt:  164 lb    Usual Body Wt:  72.6 kg (160 lb) (6/2018; no recent weight hx)     Ideal Body Wt:  142 lbs:  115.5 %   BMI Category: Overweight (BMI 25.0-29. 9)       Nutrition Diagnosis:   Inadequate oral intake related to altered GI function (abdominal pain) as evidenced by NPO or clear liquid status due to medical condition    Nutrition Interventions:   Food and/or Nutrient Delivery: Start oral diet, Start oral nutrition supplement  Nutrition Education and Counseling: No recommendations at this time  Coordination of Nutrition Care: Continue to monitor while inpatient    Goals:  Advance diet to meet nutritional needs within the next 2-3 days       Nutrition Monitoring and Evaluation:   Behavioral-Environmental Outcomes: None identified  Food/Nutrient Intake Outcomes: Diet advancement/tolerance  Physical Signs/Symptoms Outcomes: Biochemical data, Skin, Weight, GI status, Nausea/vomiting    Discharge Planning:     Too soon to determine     Electronically signed by Hao Barth RD on 10/25/2021 at 1:33 PM

## 2021-10-25 NOTE — PERIOP NOTES
TRANSFER - IN REPORT:    Verbal report received from [id number] [facility] SURY RN(name) on Jada Rivera  being received from [facility](unit) for ordered procedure      Report consisted of patients Situation, Background, Assessment and   Recommendations(SBAR). Information from the following report(s) SBAR, Kardex and MAR was reviewed with the receiving nurse. Opportunity for questions and clarification was provided. Assessment completed upon patients arrival to unit and care assumed.

## 2021-10-25 NOTE — INTERVAL H&P NOTE
Update History & Physical    The Patient's History and Physical of October 25, 2021 was reviewed with the patient and I examined the patient. There was no change. The surgical site was confirmed by the patient and me. Plan:  The risk, benefits, expected outcome, and alternative to the recommended procedure have been discussed with the patient. Patient understands and wants to proceed with the procedure.     Electronically signed by Chani Sykes MD on 10/25/2021 at 3:07 PM

## 2021-10-25 NOTE — CONSULTS
[id number] [facility]    Name:  Chantel Stein  MR#:   [id number]  :  1935  ACCOUNT #:  [de-identified]  DATE OF SERVICE:  10/24/2021    GENERAL SURGERY CONSULTATION AND PREOPERATIVE NOTE    ADMISSION DIAGNOSES:  1. Symptomatic cholelithiasis, possible cholecystitis. 2.  Abdominal pain, unable to be treated as an outpatient. 3.  Nausea and vomiting, unable to be treated as an outpatient. 4.  Elevated liver transaminases. 5.  Abnormal liver CT. HISTORY OF PRESENT ILLNESS:  The patient is an 29-year-old Cymra[place]-speaking male (history obtained with help of daughter at bedside), with a past medical history of hypertension, admitted to the hospitalist service at [facility] Emergency Room with a two-day history (daughter believes he had a single similar, milder episode around 2 weeks ago) of increasingly severe right upper quadrant pain, which became associated with multiple episodes of nausea and vomiting, starting today. He denies any previous similar episodes. He has had some associated abdominal bloating and constipation, but no diarrhea, no bloody bowel movements. Denies any fever or chills. Denies any chest pain, shortness of breath, or other symptoms at this time. PAST MEDICAL HISTORY:  1. Hypertension. 2.  Hyperlipidemia. PAST SURGICAL HISTORY:  Denies. MEDICATIONS:  Please see med rec sheet. I do not see that he is on any antiplatelet or anticoagulation medications. ALLERGIES:  NO KNOWN DRUG ALLERGIES. SOCIAL HISTORY:  He denies smoking. Rarely drinks alcohol,. Does not use any illicit drugs. FAMILY HISTORY:  Noncontributory. REVIEW OF SYSTEMS:  A 12-point review of system was reviewed with the patient, negative apart from that listed in the HPI. PHYSICAL EXAMINATION:  GENERAL:  He is well developed, well nourished, in no acute distress.   VITAL SIGNS:  He has been afebrile since arrival.  Temperature on admission is 98.2, last temperature taken 99.4.  He has been hypertensive also since admission. Last blood pressure taken 158/70, pulse at that time 72, respirations 16, sating 96% on room air. HEENT:  Normocephalic, atraumatic. Pupils equal, round, reactive to light and accommodation. Extraocular movements intact. Sclerae anicteric. NECK:  Supple. No JVD. LUNGS:  Clear to auscultation. No accessory muscle use. CARDIOVASCULAR:  Regular rate and rhythm. ABDOMEN:  Soft, minimally distended. Bowel sounds are present. He is diffusely tender, but does have more pronounced localized tenderness and guarding with positive Daugherty sign in the right upper quadrant. No palpable umbilical hernia. No visible surgical scars. He does have a reducible, nontender left inguinal hernia. RECTAL:  Deferred. EXTREMITIES:  No clubbing or cyanosis. Good capillary refill. No calf tenderness. ANCILLARY STUDIES:  Labs on admission; white count is normal at 11.1, H and H 15.8 and 47.3, platelets 889, slight neutrophil shift of 78. BMP essentially normal apart from minimally elevated glucose at 142; total bilirubin is minimally elevated at 1.1, the last taken in 2015 was 0.8; AST elevated at 46; ALT normal.  Lipase not elevated at 62, lactic acid is not elevated, alkaline phosphatase is normal at 116. RADIOLOGY:  CT of the abdomen and pelvis with IV contrast only showed diffuse hepatic hypoattenuation consistent with hepatic steatosis, showed small gallstones within a nondistended and non-inflamed-appearing gallbladder. No significant pancreas findings. Incidental note of a left renal lesion located posteriorly, with recommendations to proceed with a dedicated renal CT. Also incidentally noted left inguinal hernia continued non-obstructed sigmoid colon with diverticulosis without diverticulitis. Otherwise, there are no significant findings on exam.    ASSESSMENT:  An 46-year-old  male with,  1. Cholelithiasis, possible cholecystitis.   2.  Elevated liver transaminases. 3.  Abnormal liver CT. 4.  Abdominal pain, not able to be treated outpatient. 5.  Nausea and vomiting, not able to be treated outpatient. PLAN:  The patient is admitted to the hospitalist service. No need for IV antibiotics at this time. He is to be made n.p.o. after midnight. I will complete the radiologic evaluation of the gallbladder and biliary tree with a formal right upper quadrant ultrasound, as long as the patient's blood pressure can be adequately controlled, and medical doctors find no reason the patient cannot proceed with general anesthesia for surgery. Would recommend proceeding to the operating room tomorrow. I discussed with the patient the nature of the surgery, alternatives, benefits, and risks. The surgery will be laparoscopic cholecystectomy as well as liver wedge biopsy to address the radiologic findings and elevated liver transaminase. The risks of the procedure include, but are not limited to, medication reaction, anesthesia complications, bleeding, infection, possible need to convert to an open procedure, inadvertant injury to intraabdominal structures requiring additional surgery, retained or de salvador common duct stones requiring endoscopy or additional intervention, injury to bile duct requiring additional surgery or other intervention, postoperative fluid collection requiring drainage, possible need to place an operative drain, postoperative pancreatitis, incisional hernia, poor wound healing postoperatively, persistence of symptoms despite surgery, etc.  The patient understands these risks and was willing to give informed consent to proceed with surgery. Justo Busby MD      [id number]  D:  10/25/2021 0:18  T:  10/25/2021 4:30  JOB #:  [id number]  CC:  Rashi Garza MD

## 2021-10-26 PROBLEM — K80.12 CALCULUS OF GALLBLADDER WITH ACUTE ON CHRONIC CHOLECYSTITIS WITHOUT OBSTRUCTION: Status: ACTIVE | Noted: 2021-10-25

## 2021-10-26 PROBLEM — R74.8 ABNORMAL TRANSAMINASES: Status: ACTIVE | Noted: 2021-10-24

## 2021-10-26 LAB
ALBUMIN SERPL-MCNC: 2.4 G/DL (ref 3.4–5)
ALBUMIN/GLOB SERPL: 0.7 {RATIO} (ref 0.8–1.7)
ALP SERPL-CCNC: 90 U/L (ref 45–117)
ALT SERPL-CCNC: 87 U/L (ref 16–61)
ANION GAP SERPL CALC-SCNC: 8 MMOL/L (ref 3–18)
AST SERPL-CCNC: 157 U/L (ref 10–38)
BASOPHILS # BLD: 0 K/UL (ref 0–0.1)
BASOPHILS NFR BLD: 0 % (ref 0–2)
BILIRUB SERPL-MCNC: 1 MG/DL (ref 0.2–1)
BUN SERPL-MCNC: 15 MG/DL (ref 7–18)
BUN/CREAT SERPL: 24 (ref 12–20)
CALCIUM SERPL-MCNC: 7.7 MG/DL (ref 8.5–10.1)
CHLORIDE SERPL-SCNC: 106 MMOL/L (ref 100–111)
CO2 SERPL-SCNC: 24 MMOL/L (ref 21–32)
CREAT SERPL-MCNC: 0.62 MG/DL (ref 0.6–1.3)
DIFFERENTIAL METHOD BLD: ABNORMAL
EOSINOPHIL # BLD: 0 K/UL (ref 0–0.4)
EOSINOPHIL NFR BLD: 0 % (ref 0–5)
ERYTHROCYTE [DISTWIDTH] IN BLOOD BY AUTOMATED COUNT: 14 % (ref 11.6–14.5)
GLOBULIN SER CALC-MCNC: 3.3 G/DL (ref 2–4)
GLUCOSE SERPL-MCNC: 127 MG/DL (ref 74–99)
HCT VFR BLD AUTO: 39.8 % (ref 36–48)
HGB BLD-MCNC: 13.1 G/DL (ref 13–16)
LYMPHOCYTES # BLD: 0.7 K/UL (ref 0.9–3.6)
LYMPHOCYTES NFR BLD: 4 % (ref 21–52)
MAGNESIUM SERPL-MCNC: 2 MG/DL (ref 1.6–2.6)
MCH RBC QN AUTO: 31.3 PG (ref 24–34)
MCHC RBC AUTO-ENTMCNC: 32.9 G/DL (ref 31–37)
MCV RBC AUTO: 95 FL (ref 78–100)
MONOCYTES # BLD: 2.1 K/UL (ref 0.05–1.2)
MONOCYTES NFR BLD: 12 % (ref 3–10)
NEUTS SEG # BLD: 13.8 K/UL (ref 1.8–8)
NEUTS SEG NFR BLD: 83 % (ref 40–73)
PLATELET # BLD AUTO: 176 K/UL (ref 135–420)
PMV BLD AUTO: 11.4 FL (ref 9.2–11.8)
POTASSIUM SERPL-SCNC: 3.7 MMOL/L (ref 3.5–5.5)
PROT SERPL-MCNC: 5.7 G/DL (ref 6.4–8.2)
RBC # BLD AUTO: 4.19 M/UL (ref 4.35–5.65)
SODIUM SERPL-SCNC: 138 MMOL/L (ref 136–145)
WBC # BLD AUTO: 16.8 K/UL (ref 4.6–13.2)

## 2021-10-26 PROCEDURE — 74011250636 HC RX REV CODE- 250/636: Performed by: SURGERY

## 2021-10-26 PROCEDURE — 85025 COMPLETE CBC W/AUTO DIFF WBC: CPT

## 2021-10-26 PROCEDURE — 74011000258 HC RX REV CODE- 258: Performed by: SURGERY

## 2021-10-26 PROCEDURE — 77030027138 HC INCENT SPIROMETER -A

## 2021-10-26 PROCEDURE — 65270000029 HC RM PRIVATE

## 2021-10-26 PROCEDURE — 77010033678 HC OXYGEN DAILY

## 2021-10-26 PROCEDURE — 80053 COMPREHEN METABOLIC PANEL: CPT

## 2021-10-26 PROCEDURE — 74011250637 HC RX REV CODE- 250/637: Performed by: SURGERY

## 2021-10-26 PROCEDURE — 83735 ASSAY OF MAGNESIUM: CPT

## 2021-10-26 PROCEDURE — 74011000258 HC RX REV CODE- 258: Performed by: HOSPITALIST

## 2021-10-26 PROCEDURE — 74011250636 HC RX REV CODE- 250/636: Performed by: HOSPITALIST

## 2021-10-26 PROCEDURE — 36415 COLL VENOUS BLD VENIPUNCTURE: CPT

## 2021-10-26 RX ORDER — MORPHINE SULFATE 2 MG/ML
1 INJECTION, SOLUTION INTRAMUSCULAR; INTRAVENOUS
Status: DISCONTINUED | OUTPATIENT
Start: 2021-10-26 | End: 2021-10-27

## 2021-10-26 RX ADMIN — PIPERACILLIN AND TAZOBACTAM 3.38 G: 3; .375 INJECTION, POWDER, LYOPHILIZED, FOR SOLUTION INTRAVENOUS at 14:00

## 2021-10-26 RX ADMIN — PIPERACILLIN AND TAZOBACTAM 3.38 G: 3; .375 INJECTION, POWDER, LYOPHILIZED, FOR SOLUTION INTRAVENOUS at 01:00

## 2021-10-26 RX ADMIN — PIPERACILLIN AND TAZOBACTAM 3.38 G: 3; .375 INJECTION, POWDER, LYOPHILIZED, FOR SOLUTION INTRAVENOUS at 20:54

## 2021-10-26 RX ADMIN — AMLODIPINE BESYLATE 5 MG: 5 TABLET ORAL at 08:45

## 2021-10-26 RX ADMIN — PIPERACILLIN AND TAZOBACTAM 3.38 G: 3; .375 INJECTION, POWDER, LYOPHILIZED, FOR SOLUTION INTRAVENOUS at 08:07

## 2021-10-26 RX ADMIN — PROMETHAZINE HYDROCHLORIDE 12.5 MG: 25 INJECTION INTRAMUSCULAR; INTRAVENOUS at 11:32

## 2021-10-26 RX ADMIN — ONDANSETRON 4 MG: 2 INJECTION INTRAMUSCULAR; INTRAVENOUS at 08:06

## 2021-10-26 NOTE — PROGRESS NOTES
Hospitalist Progress Note    Patient: Chitra Ruiz MRN: [id number]  CSN: [id number]    YOB: 1935  Age: 82 y.o. Sex: male    DOA: 10/24/2021 LOS:  LOS: 2 days                Assessment/Plan     Patient Active Problem List   Diagnosis Code    Abdominal pain R10.9    Calculus of gallbladder and bile duct without cholecystitis K80.70    Renal mass N28.89    Hematuria R31.9    Nausea and vomiting R11.2    HTN (hypertension) I10    Hyperlipemia E78.5    Abnormal liver ultrasound R93.2        Chief complaint :  82 y.o. male Mozambican-speaking, with past medical history of hypertension, hyperlipidemia who was brought by EMS today for 2 days history of constant abdominal pain, N/V. Cholelithiasis/cholecystitis -  Seen by general surgery  s/p sushant mirtha  On zosyn  Post op diet per general surgery       HTN -   Amlodipine PO. Pain control. Monitor BP closely     Left Renal mass with hematuria -   Need to follow up with urology . Disposition : TBD    Review of systems  General: No fevers or chills. Cardiovascular: No chest pain or pressure. No palpitations. Pulmonary: No shortness of breath. Gastrointestinal: see above. Physical Exam:  General: Awake, cooperative, no acute distress    HEENT: NC, Atraumatic. PERRLA, anicteric sclerae. Lungs: CTA Bilaterally. No Wheezing/Rhonchi/Rales. Heart:  S1 S2,  No murmur, No Rubs, No Gallops  Abdomen: Soft, Non distended, gen tender.  +Bowel sounds,   Extremities: No c/c/e  Psych:   Not anxious or agitated. Neurologic:  No acute neurological deficit.            Vital signs/Intake and Output:  Visit Vitals  BP (!) 121/55 (BP 1 Location: Right upper arm, BP Patient Position: At rest)   Pulse 82   Temp 99.2 °F (37.3 °C)   Resp 18   Ht 5' 6\" (1.676 m)   Wt 74.6 kg (164 lb 8 oz)   SpO2 97%   BMI 26.55 kg/m²     Current Shift:  10/26 0701 - 10/26 1900  In: 1500 [I.V.:1500]  Out: 50 [Drains:50]  Last three shifts:  10/24 1901 - 10/26 0700  In: 1000 [I.V.:1000]  Out: 51 [Drains:51]            Labs: Results:       Chemistry Recent Labs     10/26/21  0200 10/24/21  1142   * 142*    137   K 3.7 3.9    103   CO2 24 29   BUN 15 19*   CREA 0.62 0.63   CA 7.7* 9.0   AGAP 8 5   BUCR 24* 30*   AP 90 116   TP 5.7* 7.6   ALB 2.4* 3.7   GLOB 3.3 3.9   AGRAT 0.7* 0.9      CBC w/Diff Recent Labs     10/26/21  0200 10/24/21  1142   WBC 16.8* 11.1   RBC 4.19* 5.14   HGB 13.1 15.8   HCT 39.8 47.3    203   GRANS 83* 78*   LYMPH 4* 12*   EOS 0 0      Cardiac Enzymes No results for input(s): CPK, CKND1, MOHSEN in the last 72 hours. No lab exists for component: CKRMB, TROIP   Coagulation No results for input(s): PTP, INR, APTT, INREXT, INREXT in the last 72 hours. Lipid Panel No results found for: CHOL, CHOLPOCT, CHOLX, CHLST, CHOLV, 288317, HDL, HDLP, LDL, LDLC, DLDLP, 530293, VLDLC, VLDL, TGLX, TRIGL, TRIGP, TGLPOCT, CHHD, CHHDX   BNP No results for input(s): BNPP in the last 72 hours.    Liver Enzymes Recent Labs     10/26/21  0200   TP 5.7*   ALB 2.4*   AP 90      Thyroid Studies No results found for: T4, T3U, TSH, TSHEXT, TSHEXT     Procedures/imaging: see electronic medical records for all procedures/Xrays and details which were not copied into this note but were reviewed prior to creation of Plan

## 2021-10-26 NOTE — OP NOTES
[facility] [place]  OPERATIVE REPORT    Name:  Adithya Hernandez  MR#:   [id number]  :  1935  ACCOUNT #:  [de-identified]  DATE OF SERVICE:  10/25/2021    PREOPERATIVE DIAGNOSES:  1. Cholelithiasis, possible cholecystitis. 2.  Elevated liver transaminases. 3.  Abnormal liver ultrasound. POSTOPERATIVE DIAGNOSES:  1. Severe cholecystitis secondary to cholelithiasis. 2.  Elevated liver transaminases. 3.  Abnormal liver ultrasound, grossly normal-appearing liver. PROCEDURES PERFORMED:  1. Laparoscopic cholecystectomy (modifier 22 for extremely inflamed nature of gallbladder requiring at least 50% extra operative time adding both operative time and technical difficulty requiring opening of extra instrumentation to the overall case, see dictated note below). 2.  Liver wedge biopsy. SURGEON:  Kang Alcazar MD    ASSISTANT:  None. ANESTHESIA:  General.    COMPLICATIONS:  None. SPECIMENS REMOVED:  Gallbladder, liver wedge biopsy. IMPLANTS:  Additional implants, none. ESTIMATED BLOOD LOSS:  Less than 100 mL. DRAINS:  19-[place] Channel Amador-Roche. INDICATIONS:  The patient is an 60-year-old  male admitted to the hospitalist service for a reported two-day history of upper abdominal pain radiating to the right side and lower abdomen. In hindsight, the daughter (the patient is Upper sorbian speaking), believes he has had at least one previous episode two weeks ago (denies any long-term complaints). White count was normal, but he did have a slight neutrophil left shift at 78. Total bilirubin was minimally elevated at 1.1. ALT was normal.  AST was elevated. Lipase normal.  Alk phos was actually normal (interestingly, his alk phos was elevated at 179 at his last labs I see prior to this one at 179).   CT showed cholelithiasis without radiologic evidence of cholecystitis as did the ultrasound, small stones and sludge with a dilated/distended gallbladder, possible adenomyosis versus thickened wall. I recommended proceeding to the OR. I discussed this with the patient via  as well as his English-speaking daughter at bedside the nature of surgery, alternative, benefits, and risks. He gave consent to proceed. PROCEDURE:  He was taken on the day following my initial evaluation to the operating room. He was met and identified in the preoperative holding area. He was then brought to the operating room. Preoperative intravenous IV antibiotics were given per protocol. He was positioned on the table. All pressure points appropriately padded. Sequential compression devices were applied. General anesthesia was administered with monitors and oxygen in place. The area was shaved with electric clippers, prepped and draped in the usual sterile fashion. After surgical pause, surgical site was locally anesthetized with 0.25% Marcaine with epinephrine. We started with a 5-mm transverse infraumbilical incision with 08-[id number] through skin and dermis. The underlying subcutaneous tissue was bluntly dissected down to the midline fascia. Then while elevating the adjacent umbilicus using penetrating towel clamp, the position of Veress needle was confirmed using saline aspiration followed by injection. Once this position was confirmed, pneumoperitoneum was established to a pressure of 15 mmHg. Once this pressure was achieved, Veress needle was removed and a 5-mm bladeless trocar was inserted while elevating the adjacent umbilicus using penetrating towel clamp. A 30-degree laparoscope was inserted. There was no evidence of any Veress needle or trocar-induced trauma to the underlying abdominal viscera. This area was again visualized at the end procedure from the subxiphoid location. There were no adhesions in this area and again no evidence of trauma to the underlying abdominal viscera was present.   Next, a 12-mm transverse subxiphoid incision was made, and through this, a 12-mm bladeless trocar was inserted just to the right lateral side of falciform ligament. The right upper quadrant and right flank 5-mm subcostal incisions were made at the beginning of the case for trocar insertion and both trocars were inserted under laparoscopic vision. It became immediately apparent with the patient now placed in reverse Trendelenburg position that the gallbladder was significantly distended and associated with significant inflammatory changes. There was a thickened, almost chronic rind of omentum encasing the gallbladder which was significantly distended. Starting at this point of the case, the additional time and technical difficulty was required in terms of dissecting the gallbladder away from the adjacent adherent structures as well as dissecting the gallbladder out of the very inflamed intrahepatic gallbladder fossa where there was no true avascular plane between gallbladder and liver parenchyma. In order to grasp the gallbladder, I did have to open a decompression needle and a large syringe. This was used to aspirate mixture of brown-green bile as well as obvious pus. Approximately 60 mL was aspirated, allowed me to grasp the gallbladder. Gallbladder itself was extremely friable, fragmented in several places just during the normal part of dissection. We were able to elevate towards the anterior abdominal wall in a cephalad direction and get down to the infundibula after dissecting the rest of the omentum length. There was no clear safe plane to dissect down from a bottom-up approach. Therefore, I did a top-down approach dissecting the fundus away from the gallbladder fossa. As already mentioned, there were very few areas where there was a clear avascular plane between gallbladder and liver. We dissected down to the infundibulum.   During the course of dissection, I was able to isolate two branches of the cystic artery, one during the medial dissection and one during the posterior where a small branch was detected posteriorly. These were clipped and divided with harmonic scalpel dissection which also was an extra piece of instrumentation normally I would not open during the case. At this point, I actually had several rents in the gallbladder allowing me to see internally. I was able to confirm that the cystic duct was  visible from the inside, but I could not get a clear safe plane to get around it with either an Endo Stitch or certainly not with a clip. Therefore, I transected the majority of the gallbladder including the infundibulum with the specimen where I thought it was safe, free, and clear from the cystic artery and out of the gallbladder fossa. This was done with Harmonic scalpel dissection. I was able to actually approximate the edges of the remaining gallbladder using 2-0 Endo Stitch V-Loc suture staying well clear from the cystic duct itself. Prior to removal of the gallbladder, I did perform a liver biopsy for the above-mentioned reasons. I scored an adequate size specimen along the inferior edge of the liver using cautery, then removed it with a 10-mm spoon. The resulting defect was cauterized to ensure good hemostasis. Gallbladder was placed into an EndoCatch bag, removed through the subxiphoid incision, and sent for permanent pathology. Gallbladder fossa was then thoroughly irrigated with 3 L of normal saline. Irrigation was evacuated completely as possible. On final inspection, gallbladder fossa was clean and dry with good hemostasis. No evidence of any biliary leakage. The cystic duct was also cleaned and dried and I did not see any bile coming out of this area at the end of the procedure. Prior to placing the drain, I did remove the 12-mm trocar temporarily and placed a figure-of-eight 0 Vicryl suture on the fascial defect.   Prior to closing this figure-of-eight suture, I replaced the 12-mm bladeless trocar and I used this to bring a 19-La[place] Channel Amador-Roche drain cut to the appropriate internal length, bringing it out through the right flank trocar site. The Amador-Roche drain was placed into the gallbladder fossa and then externally secured using 3-0 nylon suture. Subxiphoid trocar was removed. The fascial defect was then closed with a previously placed 0 Vicryl suture. Upper abdominal trocars were removed. Hemostasis was satisfactory at all sites. The patient was placed back in level position. Pneumoperitoneum was evacuated through the umbilical trocar before it too was removed. Hemostasis was satisfactory at this site as well. All surgical incisions were then irrigated. Subxiphoid incision was closed in layers with interrupted 3-0 Vicryl dermal suture followed by running 4-0 Monocryl subcuticular suture to close skin. The two remaining open 5-mm trocar sites were closed with 4-0 Monocryl subcuticular suture. Incision was then cleaned, dried, dressed with Dermabond. a drain dressing was placed around the right flank LARRY exit site. Externally, the LARRY drain was cut to appropriate length and secured onto the LARRY bulb. The patient tolerated the entire procedure without incident. He was extubated in the OR and taken to recovery room postoperatively in stable condition. Aric Bustos MD      [id number]  D:  10/25/2021 17:45  T:  10/26/2021 0:45  JOB #:  [id number]  CC:  Yesenia.  Adeel Serrano MD

## 2021-10-26 NOTE — PROGRESS NOTES
Pt on 4 liters O2 n/c-sats 99% at rest.  O2 turned off as patient sat on edge of bed-Sats 86% on room air. Pt placed on 4 liters of O2 and ambulated 50 feet in hallway with walker. Pt sats 95-96%.

## 2021-10-26 NOTE — PROGRESS NOTES
POD#1  Pt feels much better, no sig pain. Robert clears, mild nausea (no vomit), No flatus yet. Has been ambulating in sen.   AVSS    LARRY 50 serosang so far today    RRR  CTA  Distended, some BS present, incisions c/d/i, LARRY w serosang  No calf tender    WBC 16.8, H/H 13.1/39.8, BMP ok, Bili 1.0    Stable s/p lap mirtha, liver bx  Clears until +flatus  Cont IV abx  OOB/spirometer  Keep LARRY at least until robert reg diet for 24+ hours  Repeat labs in am

## 2021-10-27 LAB
ALBUMIN SERPL-MCNC: 2.1 G/DL (ref 3.4–5)
ALBUMIN/GLOB SERPL: 0.7 {RATIO} (ref 0.8–1.7)
ALP SERPL-CCNC: 80 U/L (ref 45–117)
ALT SERPL-CCNC: 67 U/L (ref 16–61)
ANION GAP SERPL CALC-SCNC: 5 MMOL/L (ref 3–18)
AST SERPL-CCNC: 64 U/L (ref 10–38)
BASOPHILS # BLD: 0.1 K/UL (ref 0–0.1)
BASOPHILS NFR BLD: 0 % (ref 0–2)
BILIRUB SERPL-MCNC: 1 MG/DL (ref 0.2–1)
BUN SERPL-MCNC: 13 MG/DL (ref 7–18)
BUN/CREAT SERPL: 24 (ref 12–20)
CALCIUM SERPL-MCNC: 7.8 MG/DL (ref 8.5–10.1)
CHLORIDE SERPL-SCNC: 106 MMOL/L (ref 100–111)
CO2 SERPL-SCNC: 28 MMOL/L (ref 21–32)
CREAT SERPL-MCNC: 0.54 MG/DL (ref 0.6–1.3)
DIFFERENTIAL METHOD BLD: ABNORMAL
EOSINOPHIL # BLD: 0.1 K/UL (ref 0–0.4)
EOSINOPHIL NFR BLD: 0 % (ref 0–5)
ERYTHROCYTE [DISTWIDTH] IN BLOOD BY AUTOMATED COUNT: 13.8 % (ref 11.6–14.5)
GLOBULIN SER CALC-MCNC: 3.2 G/DL (ref 2–4)
GLUCOSE SERPL-MCNC: 116 MG/DL (ref 74–99)
HCT VFR BLD AUTO: 35.2 % (ref 36–48)
HGB BLD-MCNC: 11.7 G/DL (ref 13–16)
LYMPHOCYTES # BLD: 1.2 K/UL (ref 0.9–3.6)
LYMPHOCYTES NFR BLD: 9 % (ref 21–52)
MAGNESIUM SERPL-MCNC: 2.3 MG/DL (ref 1.6–2.6)
MCH RBC QN AUTO: 30.6 PG (ref 24–34)
MCHC RBC AUTO-ENTMCNC: 33.2 G/DL (ref 31–37)
MCV RBC AUTO: 92.1 FL (ref 78–100)
MONOCYTES # BLD: 1.7 K/UL (ref 0.05–1.2)
MONOCYTES NFR BLD: 13 % (ref 3–10)
NEUTS SEG # BLD: 10 K/UL (ref 1.8–8)
NEUTS SEG NFR BLD: 76 % (ref 40–73)
PLATELET # BLD AUTO: 180 K/UL (ref 135–420)
PMV BLD AUTO: 11.1 FL (ref 9.2–11.8)
POTASSIUM SERPL-SCNC: 3.3 MMOL/L (ref 3.5–5.5)
PROT SERPL-MCNC: 5.3 G/DL (ref 6.4–8.2)
RBC # BLD AUTO: 3.82 M/UL (ref 4.35–5.65)
SODIUM SERPL-SCNC: 139 MMOL/L (ref 136–145)
WBC # BLD AUTO: 13.2 K/UL (ref 4.6–13.2)

## 2021-10-27 PROCEDURE — 74011250637 HC RX REV CODE- 250/637: Performed by: HOSPITALIST

## 2021-10-27 PROCEDURE — 83735 ASSAY OF MAGNESIUM: CPT

## 2021-10-27 PROCEDURE — 74011250636 HC RX REV CODE- 250/636: Performed by: SURGERY

## 2021-10-27 PROCEDURE — 85025 COMPLETE CBC W/AUTO DIFF WBC: CPT

## 2021-10-27 PROCEDURE — 77010033678 HC OXYGEN DAILY

## 2021-10-27 PROCEDURE — 97162 PT EVAL MOD COMPLEX 30 MIN: CPT

## 2021-10-27 PROCEDURE — 97535 SELF CARE MNGMENT TRAINING: CPT

## 2021-10-27 PROCEDURE — 74011000258 HC RX REV CODE- 258: Performed by: SURGERY

## 2021-10-27 PROCEDURE — 80053 COMPREHEN METABOLIC PANEL: CPT

## 2021-10-27 PROCEDURE — 65270000029 HC RM PRIVATE

## 2021-10-27 PROCEDURE — 97116 GAIT TRAINING THERAPY: CPT

## 2021-10-27 PROCEDURE — 36415 COLL VENOUS BLD VENIPUNCTURE: CPT

## 2021-10-27 PROCEDURE — 74011250637 HC RX REV CODE- 250/637: Performed by: SURGERY

## 2021-10-27 PROCEDURE — 97165 OT EVAL LOW COMPLEX 30 MIN: CPT

## 2021-10-27 RX ORDER — POTASSIUM CHLORIDE 20 MEQ/1
40 TABLET, EXTENDED RELEASE ORAL
Status: COMPLETED | OUTPATIENT
Start: 2021-10-27 | End: 2021-10-27

## 2021-10-27 RX ORDER — MORPHINE SULFATE 2 MG/ML
1 INJECTION, SOLUTION INTRAMUSCULAR; INTRAVENOUS
Status: DISCONTINUED | OUTPATIENT
Start: 2021-10-27 | End: 2021-10-28

## 2021-10-27 RX ADMIN — POTASSIUM CHLORIDE 40 MEQ: 20 TABLET, EXTENDED RELEASE ORAL at 12:29

## 2021-10-27 RX ADMIN — AMLODIPINE BESYLATE 5 MG: 5 TABLET ORAL at 08:12

## 2021-10-27 RX ADMIN — PIPERACILLIN AND TAZOBACTAM 3.38 G: 3; .375 INJECTION, POWDER, LYOPHILIZED, FOR SOLUTION INTRAVENOUS at 06:47

## 2021-10-27 RX ADMIN — SODIUM CHLORIDE, SODIUM LACTATE, POTASSIUM CHLORIDE, AND CALCIUM CHLORIDE 100 ML/HR: 600; 310; 30; 20 INJECTION, SOLUTION INTRAVENOUS at 08:11

## 2021-10-27 RX ADMIN — PIPERACILLIN AND TAZOBACTAM 3.38 G: 3; .375 INJECTION, POWDER, LYOPHILIZED, FOR SOLUTION INTRAVENOUS at 21:15

## 2021-10-27 RX ADMIN — PIPERACILLIN AND TAZOBACTAM 3.38 G: 3; .375 INJECTION, POWDER, LYOPHILIZED, FOR SOLUTION INTRAVENOUS at 12:29

## 2021-10-27 RX ADMIN — PIPERACILLIN AND TAZOBACTAM 3.38 G: 3; .375 INJECTION, POWDER, LYOPHILIZED, FOR SOLUTION INTRAVENOUS at 01:39

## 2021-10-27 NOTE — PROGRESS NOTES
Hospitalist Progress Note    Patient: Santana Casey MRN: [id number]  CSN: [id number]    YOB: 1935  Age: 82 y.o. Sex: male    DOA: 10/24/2021 LOS:  LOS: 3 days                Assessment/Plan     Patient Active Problem List   Diagnosis Code    Abdominal pain R10.9    Calculus of gallbladder and bile duct without cholecystitis K80.70    Renal mass N28.89    Hematuria R31.9    Nausea and vomiting R11.2    HTN (hypertension) I10    Hyperlipemia E78.5    Abnormal liver ultrasound R93.2    Calculus of gallbladder with acute on chronic cholecystitis without obstruction K80.12    Abnormal transaminases R74.8        Chief complaint :  82 y.o. male Macedonian-speaking, with past medical history of hypertension, hyperlipidemia who was brought by EMS today for 2 days history of constant abdominal pain, N/V. Pain much better, +flatus    Cholelithiasis/cholecystitis -  Seen by general surgery  s/p lap mirtha  On zosyn  Post op diet per general surgery       HTN -   Amlodipine PO. Pain control. Monitor BP closely     Left Renal mass with hematuria -   Need to follow up with urology . Recommend continued hospitalization per general surgery. Still has drain in place. Discussed with daughter at bedside. Disposition : 1-2 days    Review of systems  General: No fevers or chills. Cardiovascular: No chest pain or pressure. No palpitations. Pulmonary: No shortness of breath. Gastrointestinal: see above. Physical Exam:  General: Awake, cooperative, no acute distress    HEENT: NC, Atraumatic. PERRLA, anicteric sclerae. Lungs: CTA Bilaterally. No Wheezing/Rhonchi/Rales. Heart:  S1 S2,  No murmur, No Rubs, No Gallops  Abdomen: Soft, Non distended, gen tender.  +Bowel sounds,   Extremities: No c/c/e  Psych:   Not anxious or agitated. Neurologic:  No acute neurological deficit.            Vital signs/Intake and Output:  Visit Vitals  /66   Pulse 92   Temp 97.8 °F (36.6 °C)   Resp 16   Ht 5' 6\" (1.676 m)   Wt 73.7 kg (162 lb 6.4 oz)   SpO2 100%   BMI 26.21 kg/m²     Current Shift:  No intake/output data recorded. Last three shifts:  10/25 1901 - 10/27 0700  In: 2150 [I.V.:2150]  Out: 121 [Drains:121]            Labs: Results:       Chemistry Recent Labs     10/27/21  0310 10/26/21  0200   * 127*    138   K 3.3* 3.7    106   CO2 28 24   BUN 13 15   CREA 0.54* 0.62   CA 7.8* 7.7*   AGAP 5 8   BUCR 24* 24*   AP 80 90   TP 5.3* 5.7*   ALB 2.1* 2.4*   GLOB 3.2 3.3   AGRAT 0.7* 0.7*      CBC w/Diff Recent Labs     10/27/21  0310 10/26/21  0200   WBC 13.2 16.8*   RBC 3.82* 4.19*   HGB 11.7* 13.1   HCT 35.2* 39.8    176   GRANS 76* 83*   LYMPH 9* 4*   EOS 0 0      Cardiac Enzymes No results for input(s): CPK, CKND1, MOHSEN in the last 72 hours. No lab exists for component: CKRMB, TROIP   Coagulation No results for input(s): PTP, INR, APTT, INREXT, INREXT in the last 72 hours. Lipid Panel No results found for: CHOL, CHOLPOCT, CHOLX, CHLST, CHOLV, 203946, HDL, HDLP, LDL, LDLC, DLDLP, 516620, VLDLC, VLDL, TGLX, TRIGL, TRIGP, TGLPOCT, CHHD, CHHDX   BNP No results for input(s): BNPP in the last 72 hours.    Liver Enzymes Recent Labs     10/27/21  0310   TP 5.3*   ALB 2.1*   AP 80      Thyroid Studies No results found for: T4, T3U, TSH, TSHEXT, TSHEXT     Procedures/imaging: see electronic medical records for all procedures/Xrays and details which were not copied into this note but were reviewed prior to creation of Plan

## 2021-10-27 NOTE — PROGRESS NOTES
Bedside and verbal shift change report given to Dereje Rendon RN (on coming nurse) by Daniel Rubio RN (off going nurse). Report included the following information SBAR, Kardex, OR Summary, Intake/Output and MAR. Bedside and verbal shift change report given to Dereje Rendon RN (on coming nurse) by Daniel Rubio RN (off going nurse). Report included the following information SBAR, Kardex, OR Summary, Intake/Output and MAR.

## 2021-10-27 NOTE — PROGRESS NOTES
Nutrition Assessment     Type and Reason for Visit: Reassess    Nutrition Recommendations/Plan: Will order ensure enlive TID to meet nutritional needs. Nutrition Assessment:  Admited w/ Abdominal pain possible secondary to his cholelithiasis, left renal mass w/ hematuria. s/p lap mirtha w/ liver biopsy. Estimated Daily Nutrient Needs:  Energy (kcal):  1843  Protein (g):  74       Fluid (ml/day):  1843    Nutrition Related Findings:  Labs and meds reviewed- lactated ringers. No BM since admission x3 days. Diet advanced to regular w/ low fat/low chol/high fiber this AM. Per last RD, pt was agreeable to ensure enlive- will order TID.       Current Nutrition Therapies:  ADULT DIET Regular; Low Fat/Low Chol/High Fiber/CLAIRE    Anthropometric Measures:  Height:  5' 6\" (167.6 cm)  Current Body Wt:  73.7 kg (162 lb 7.7 oz) (10/26/21)  BMI: 26.2    Nutrition Diagnosis:   Predicted inadequate energy intake related to altered GI function as evidenced by other (specify) (Diet advanced this AM. Pt appears to have lost 2lb since admited, most likely related to being NPO.)    Nutrition Intervention:  Food and/or Nutrient Delivery: Continue current diet, Start oral nutrition supplement  Nutrition Education and Counseling: No recommendations at this time  Coordination of Nutrition Care: Continue to monitor while inpatient    Goals:  PO intake of most meals and supplements >50% throughout the next 2-5 days       Nutrition Monitoring and Evaluation:   Behavioral-Environmental Outcomes: None identified  Food/Nutrient Intake Outcomes: Food and nutrient intake, Supplement intake, Diet advancement/tolerance  Physical Signs/Symptoms Outcomes: Biochemical data, Meal time behavior, Skin, Weight, GI status    Discharge Planning:    (P) Continue current diet, Continue oral nutrition supplement     Electronically signed by Macrina Lizarraga RD on 10/27/2021 at 3:49 PM

## 2021-10-27 NOTE — PROGRESS NOTES
POD#2  Pt feels much better, no sig pain. Robert clears, no nausea, + flatus. Has been ambulating in sen.   AVSS    LARRY 70 serosang/24h    RRR  CTA  Softly distended, some BS present, incisions c/d/i, LARRY w serosang  No calf tender    WBC 13.2, BMP ok, Bili 1.0    Stable s/p lap mirtha, liver bx  Low fat diet  Cont IV abx  OOB/spirometer  Keep LARRY at least until robert reg diet for 24+ hours  Repeat labs in am

## 2021-10-27 NOTE — PROGRESS NOTES
Problem: Self Care Deficits Care Plan (Adult)  Goal: *Acute Goals and Plan of Care (Insert Text)  Description: Occupational Therapy Goals  Initiated 10/27/2021 within 7 day(s). 1.  Patient will perform grooming with supervision/set-up standing at sink for 5 minutes or more. 2.  Patient will perform upper body dressing with independence. 3.  Patient will perform lower body dressing with supervision/set-up. 4.  Patient will perform toilet transfers with supervision/set-up. 5.  Patient will perform all aspects of toileting with supervision/set-up. 6.  Patient will participate in upper extremity therapeutic exercise/activities with supervision/set-up for 10 minutes. 7.  Patient will utilize energy conservation techniques during functional activities with verbal, visual, and tactile cues. OCCUPATIONAL THERAPY EVALUATION    Patient: Erma Miles (52 y.o. male)  Date: 10/27/2021  Primary Diagnosis: Abdominal pain [R10.9]  Procedure(s) (LRB):  LAPAROSCOPIC CHOLECYSTECTOMY WITH LIVER BIOPSY (N/A) 2 Days Post-Op   Precautions:   Fall  PLOF: S for ADLs and transfers with RW for transfers as needed    ASSESSMENT :  Based on the objective data described below, the patient presents with decreased strength, endurance and balance for carryover of ADLs and transfers following above mentioned surgical procedure. Pt is primarily Yi speaking and pt;s daughter was present in room during the session. Pt participate with bed mobility, supine<>sit, sit<>stand transfers, toilet transfers, toileting and LB dressing during this session. Pt requires occasional cues for safety with transfers and pt demo understanding and corrects self during this session. Pt was left supine in bed at the end of session in NAD. Patient will benefit from skilled intervention to address the above impairments.   Patient's rehabilitation potential is considered to be Good  Factors which may influence rehabilitation potential include:   [x] None noted  []             Mental ability/status  []             Medical condition  []             Home/family situation and support systems  []             Safety awareness  []             Pain tolerance/management  []             Other:      PLAN :  Recommendations and Planned Interventions:   [x]               Self Care Training                  [x]      Therapeutic Activities  [x]               Functional Mobility Training   []      Cognitive Retraining  [x]               Therapeutic Exercises           [x]      Endurance Activities  [x]               Balance Training                    []      Neuromuscular Re-Education  []               Visual/Perceptual Training     [x]      Home Safety Training  [x]               Patient Education                   [x]      Family Training/Education  []               Other (comment):    Frequency/Duration: Patient will be followed by occupational therapy 1-2 times per day/4-7 days per week to address goals. Discharge Recommendations: Home Health  Further Equipment Recommendations for Discharge: shower chair      SUBJECTIVE:   Patient stated Nemesio Frazier.     OBJECTIVE DATA SUMMARY:   History reviewed. No pertinent past medical history. History reviewed. No pertinent surgical history.   Barriers to Learning/Limitations: yes;  language  Compensate with: visual, verbal, tactile, kinesthetic cues/model    Home Situation:   Home Situation  Home Environment: Private residence  # Steps to Enter: 3  Rails to Enter: Yes  Hand Rails : Bilateral (WIDE)  One/Two Story Residence: One story  # of Interior Steps: 12  Living Alone: No  Support Systems: Spouse/Significant Other, Child(leno), Other Family Member(s)  Patient Expects to be Discharged to[de-identified] House  Current DME Used/Available at Home: Cane, straight, Walker, rolling  Tub or Shower Type: Shower  []  Right hand dominant   []  Left hand dominant    Cognitive/Behavioral Status:  Neurologic State: Alert  Orientation Level: Oriented X4  Cognition: Appropriate for age attention/concentration; Follows commands  Safety/Judgement: Fall prevention    Skin: intact  Edema: none    Vision/Perceptual:    Tracking: Able to track stimulus in all quadrants w/o difficulty    Corrective Lenses: Reading glasses  Coordination: BUE  Coordination: Within functional limits  Fine Motor Skills-Upper: Left Intact; Right Intact    Gross Motor Skills-Upper: Left Intact; Right Intact  Balance:  Sitting: Intact  Standing: Intact; With support  Strength: BUE  Strength: Generally decreased, functional  Tone & Sensation: BUE  Tone: Normal  Sensation: Intact  Range of Motion: BUE  AROM: Generally decreased, functional  Functional Mobility and Transfers for ADLs:  Bed Mobility:  Rolling: Supervision  Supine to Sit: Stand-by assistance  Sit to Supine: Stand-by assistance  Scooting: Supervision;Stand-by assistance  Transfers:  Sit to Stand: Contact guard assistance  Stand to Sit: Contact guard assistance   Toilet Transfer : Contact guard assistance  ADL Assessment:   Feeding: Independent    Oral Facial Hygiene/Grooming: Stand-by assistance    Upper Body Dressing: Stand-by assistance    Lower Body Dressing: Contact guard assistance    Toileting: Contact guard assistance  ADL Intervention:  Lower Body Dressing Assistance  Dressing Assistance: Contact guard assistance  Socks: Contact guard assistance  Leg Crossed Method Used: Yes  Position Performed: Seated edge of bed  Cues: Don;Doff    Toileting  Toileting Assistance: Contact guard assistance  Bladder Hygiene: Contact guard assistance  Clothing Management: Contact guard assistance    Cognitive Retraining  Safety/Judgement: Fall prevention  Pain:  Pain level pre-treatment: 0/10   Pain level post-treatment: 0/10   Pain Intervention(s): Medication (see MAR);  Rest, Ice, Repositioning   Response to intervention: Nurse notified, See doc flow    Activity Tolerance:   Good     Please refer to the flowsheet for vital signs taken during this treatment. After treatment:   [] Patient left in no apparent distress sitting up in chair  [x] Patient left in no apparent distress in bed  [x] Call bell left within reach  [x] Nursing notified  [x] Caregiver present  [] Bed alarm activated    COMMUNICATION/EDUCATION:   [x] Role of Occupational Therapy in the acute care setting  [x] Home safety education was provided and the patient/caregiver indicated understanding. [x] Patient/family have participated as able in goal setting and plan of care. [x] Patient/family agree to work toward stated goals and plan of care. [] Patient understands intent and goals of therapy, but is neutral about his/her participation. [] Patient is unable to participate in goal setting and plan of care. Thank you for this referral.  Italia Bellamy OTR/L  Time Calculation: 24 mins    Eval Complexity: History: LOW Complexity : Brief history review ; Examination: MEDIUM Complexity : 3-5 performance deficits relating to physical, cognitive , or psychosocial skils that result in activity limitations and / or participation restrictions; Decision Making:MEDIUM Complexity : Patient may present with comorbidities that affect occupational performnce.  Miniml to moderate modification of tasks or assistance (eg, physical or verbal ) with assesment(s) is necessary to enable patient to complete evaluation

## 2021-10-27 NOTE — PROGRESS NOTES
Problem: Mobility Impaired (Adult and Pediatric)  Goal: *Acute Goals and Plan of Care (Insert Text)  Description: Physical Therapy Goals   Initiated 10/27/2021 and to be accomplished within 2-3 day(s)  1. Patient will move from supine <> sit with S in prep for out of bed activity and change of position. 2.  Patient will perform sit<> stand with S with LRAD in prep for transfers/ambulation. 3.  Patient will transfer from bed <> chair with S with LRAD for time up in chair for completion of ADL activity. 4.  Patient will ambulate 150 feet with S/LRAD for improved functional mobility at discharge. 5.  Patient will ascend/descend 3-5 stairs with handrail(s) with minimal assistance/contact guard assist for home re-entry as needed. Outcome: Progressing Towards Goal  PHYSICAL THERAPY EVALUATION    Patient: Chitra Ruiz (60 y.o. male)  Date: 10/27/2021  Primary Diagnosis: Abdominal pain [R10.9]  Procedure(s) (LRB):  LAPAROSCOPIC CHOLECYSTECTOMY WITH LIVER BIOPSY (N/A) 2 Days Post-Op   Precautions:   Fall  PLOF: amb with SPC PTA    ASSESSMENT :  Based on the objective data described below, the patient is seen on medical unit. Pt is Serbian speaking and has expressed desire for daughter to interpret for him; dgtr Jaclyn GANDHI present. Pt up in chair on arrival.  Presents with weakness LE's and decr'd independence in functional mobility following above surgery. Patient reports no pain this session. Demonstrates transfers to stand with CGA/vc for hand placement and able to complete GT/RW/CGA in sen ~110ft. Ronel decr'd, with forward bent posture (r/t surgery). Verbal cues to correct same; will need re-enforcement. Pt left back in chair in NAD and nurse Tucker Shah notified. Pt w/o c/o t/o session. Recommend HHPT, and RW pt pt used spouse's RW as needed per report. Patient will benefit from skilled intervention to address the above impairments.     Pt Education: Role of physical therapy in acute care setting, fall prevention and safety/technique during functional mobility tasks    Patient's rehabilitation potential is considered to be Good  Factors which may influence rehabilitation potential include:   []         None noted  []         Mental ability/status  [x]         Medical condition  []         Home/family situation and support systems  []         Safety awareness  []         Pain tolerance/management  []         Other:      PLAN :  Recommendations and Planned Interventions:   [x]           Bed Mobility Training             []    Neuromuscular Re-Education  [x]           Transfer Training                   []    Orthotic/Prosthetic Training  [x]           Gait Training                          []    Modalities  [x]           Therapeutic Exercises           []    Edema Management/Control  [x]           Therapeutic Activities            [x]    Family Training/Education  [x]           Patient Education  []           Other (comment):    Frequency/Duration: Patient will be followed by physical therapy 1-2 times per day/4-7 days per week to address goals. Discharge Recommendations: Home Physical Therapy  Further Equipment Recommendations for Discharge: rolling walker     SUBJECTIVE:   Patient without c/o as relayed by daughter. OBJECTIVE DATA SUMMARY:   History reviewed. No pertinent past medical history. History reviewed. No pertinent surgical history. Barriers to Learning/Limitations: yes;  language  Compensate with:  Other pt daughter Bryan Burnham present and pt desires she interpret for him  Home Situation:  Home Situation  Home Environment: Private residence  # Steps to Enter: 3  Rails to Enter: Yes  Hand Rails : Bilateral  One/Two Story Residence: One story  # of Interior Steps: 12  Living Alone: No  Support Systems: Child(leno)  Patient Expects to be Discharged to[de-identified] [employer]  Current DME Used/Available at Home: Cane, straight, Walker, rolling (amb with SPC PTA)  Critical Behavior:  Neurologic State: Alert  Cognition: Follows commands  Safety/Judgement: Awareness of environment; Fall prevention  Psychosocial  Patient Behaviors: Calm; Cooperative  Family  Behaviors: Calm;Supportive  Purposeful Interaction: Yes  Pt Identified Daily Priority: Clinical issues (comment)  Caritas Process: Nurture loving kindness;Establish trust;Teaching/learning; Attend basic human needs;Create healing environment  Caring Interventions: Reassure  Reassure: Therapeutic listening; Informing;Quiet presence;Caring rounds  Therapeutic Modalities: Intentional therapeutic touch  Skin Condition/Temp: Warm;Dry  Family  Behaviors: Calm;Supportive  Skin Integrity: Incision (comment) (lap sites and LARRY drain)  Skin Integumentary  Skin Color: Appropriate for ethnicity  Skin Condition/Temp: Warm;Dry  Skin Integrity: Incision (comment) (lap sites and LARRY drain)  Strength:    Strength: Generally decreased, functional  Tone & Sensation:   Sensation: Intact  Range Of Motion:  AROM: Generally decreased, functional  Posture:  Posture (WDL): Exceptions to WDL  Posture Assessment: Other (comment) (antalgic in standing)  Functional Mobility:  Transfers:  Sit to Stand: Contact guard assistance; Other (comment) (vc/tc)  Stand to Sit: Contact guard assistance (as above)  Balance:   Sitting: Intact  Standing: Intact; With support (RW)  Ambulation/Gait Training:  Distance (ft): 110 Feet (ft)  Assistive Device: Walker, rolling (with IV, O2 in place)  Ambulation - Level of Assistance: Contact guard assistance  Gait Description (WDL): Exceptions to WDL  Gait Abnormalities: Decreased step clearance; Other (forward posture)  Speed/Ronel: Pace decreased (<100 feet/min)  Interventions: Safety awareness training;Verbal cues; Tactile cues  Pain:  Pain level pre-treatment: 0/10   Pain level post-treatment: 0/10   Pain Intervention(s) : Medication (see MAR);  Rest, Ice, Repositioning  Response to intervention: Nurse notified, See doc flow  Activity Tolerance:   Good   Please refer to the flowsheet for vital signs taken during this treatment. After treatment:   [x]         Patient left in no apparent distress sitting up in chair  []         Patient left in no apparent distress in bed  []         Call bell left within reach  [x]         Nursing notified  [x]         Caregiver present  []         Bed alarm activated  []         SCDs applied    COMMUNICATION/EDUCATION:   [x]         Role of Physical Therapy in the acute care setting. [x]         Fall prevention education was provided and the patient/caregiver indicated understanding. [x]         Patient/family have participated as able in goal setting and plan of care. [x]         Patient/family agree to work toward stated goals and plan of care. []         Patient understands intent and goals of therapy, but is neutral about his/her participation. []         Patient is unable to participate in goal setting/plan of care: ongoing with therapy staff.  []         Other:     Thank you for this referral.  Tristan Weems, PT   Time Calculation: 25 mins      Eval Complexity: History: LOW Complexity : Zero comorbidities / personal factors that will impact the outcome / POCExam:LOW Complexity : 1-2 Standardized tests and measures addressing body structure, function, activity limitation and / or participation in recreation  Presentation: MEDIUM Complexity : Evolving with changing characteristics  Clinical Decision Making:Medium Complexity    Overall Complexity:MEDIUM

## 2021-10-27 NOTE — PROGRESS NOTES
D/C Plan: Home with physician follow up and persoanal care    CM met with pt and hisdaughter, Sandeep Martini ([phone]  at bedside to discuss transition of care. Family has indicated they do not want HH at this time. CM discussed O2. Pt was not on O2 prior to admission. Please wean O2 as appropriate andand encourage use of the incentive spirometer to assist with transition of care. Please continue to encourage ambulation. CM to continue to follow and assist with transition of care. Care Management Interventions  Mode of Transport at Discharge:  Other (see comment) (Family)  Transition of Care Consult (CM Consult): Discharge Planning  Health Maintenance Reviewed: Yes  Physical Therapy Consult: Yes  Support Systems: Child(leno)  The Plan for Transition of Care is Related to the Following Treatment Goals : Home with family support and personal care and physician follow up      Discharge Location  Discharge Placement: Home with family assistance (Home with family support and personal care and physician follow up         )

## 2021-10-27 NOTE — PROGRESS NOTES
Up in chair, no complaints, daughter by side. Up with assist to bathroom to void    0230 MD informed that pt wants sleeping aid, no order received    0755 Bedside and Verbal shift change report given to Lore Webb RN (oncoming nurse) by Gaudencio Yañez RN   (offgoing nurse). Report included the following information SBAR, Kardex, Intake/Output, MAR and Recent Results.

## 2021-10-28 VITALS
SYSTOLIC BLOOD PRESSURE: 137 MMHG | TEMPERATURE: 98.1 F | HEIGHT: 66 IN | OXYGEN SATURATION: 96 % | RESPIRATION RATE: 18 BRPM | BODY MASS INDEX: 26.1 KG/M2 | DIASTOLIC BLOOD PRESSURE: 70 MMHG | HEART RATE: 72 BPM | WEIGHT: 162.4 LBS

## 2021-10-28 LAB
ALBUMIN SERPL-MCNC: 2 G/DL (ref 3.4–5)
ALBUMIN/GLOB SERPL: 0.6 {RATIO} (ref 0.8–1.7)
ALP SERPL-CCNC: 96 U/L (ref 45–117)
ALT SERPL-CCNC: 78 U/L (ref 16–61)
ANION GAP SERPL CALC-SCNC: 4 MMOL/L (ref 3–18)
AST SERPL-CCNC: 83 U/L (ref 10–38)
BASOPHILS # BLD: 0 K/UL (ref 0–0.1)
BASOPHILS NFR BLD: 0 % (ref 0–2)
BILIRUB SERPL-MCNC: 1 MG/DL (ref 0.2–1)
BUN SERPL-MCNC: 12 MG/DL (ref 7–18)
BUN/CREAT SERPL: 26 (ref 12–20)
CALCIUM SERPL-MCNC: 7.7 MG/DL (ref 8.5–10.1)
CHLORIDE SERPL-SCNC: 106 MMOL/L (ref 100–111)
CO2 SERPL-SCNC: 27 MMOL/L (ref 21–32)
CREAT SERPL-MCNC: 0.47 MG/DL (ref 0.6–1.3)
DIFFERENTIAL METHOD BLD: ABNORMAL
EOSINOPHIL # BLD: 0.2 K/UL (ref 0–0.4)
EOSINOPHIL NFR BLD: 2 % (ref 0–5)
ERYTHROCYTE [DISTWIDTH] IN BLOOD BY AUTOMATED COUNT: 13.6 % (ref 11.6–14.5)
GLOBULIN SER CALC-MCNC: 3.3 G/DL (ref 2–4)
GLUCOSE SERPL-MCNC: 100 MG/DL (ref 74–99)
HCT VFR BLD AUTO: 33.6 % (ref 36–48)
HGB BLD-MCNC: 11.4 G/DL (ref 13–16)
LYMPHOCYTES # BLD: 1.4 K/UL (ref 0.9–3.6)
LYMPHOCYTES NFR BLD: 13 % (ref 21–52)
MAGNESIUM SERPL-MCNC: 2.1 MG/DL (ref 1.6–2.6)
MCH RBC QN AUTO: 30.8 PG (ref 24–34)
MCHC RBC AUTO-ENTMCNC: 33.9 G/DL (ref 31–37)
MCV RBC AUTO: 90.8 FL (ref 78–100)
MONOCYTES # BLD: 1.5 K/UL (ref 0.05–1.2)
MONOCYTES NFR BLD: 14 % (ref 3–10)
NEUTS SEG # BLD: 7.7 K/UL (ref 1.8–8)
NEUTS SEG NFR BLD: 70 % (ref 40–73)
PLATELET # BLD AUTO: 191 K/UL (ref 135–420)
PMV BLD AUTO: 10.4 FL (ref 9.2–11.8)
POTASSIUM SERPL-SCNC: 3.3 MMOL/L (ref 3.5–5.5)
PROT SERPL-MCNC: 5.3 G/DL (ref 6.4–8.2)
RBC # BLD AUTO: 3.7 M/UL (ref 4.35–5.65)
SODIUM SERPL-SCNC: 137 MMOL/L (ref 136–145)
WBC # BLD AUTO: 10.9 K/UL (ref 4.6–13.2)

## 2021-10-28 PROCEDURE — 74011250636 HC RX REV CODE- 250/636: Performed by: SURGERY

## 2021-10-28 PROCEDURE — 74011250637 HC RX REV CODE- 250/637: Performed by: HOSPITALIST

## 2021-10-28 PROCEDURE — 74011250637 HC RX REV CODE- 250/637: Performed by: SURGERY

## 2021-10-28 PROCEDURE — 74011000258 HC RX REV CODE- 258: Performed by: SURGERY

## 2021-10-28 PROCEDURE — 36415 COLL VENOUS BLD VENIPUNCTURE: CPT

## 2021-10-28 PROCEDURE — 90471 IMMUNIZATION ADMIN: CPT

## 2021-10-28 PROCEDURE — 90686 IIV4 VACC NO PRSV 0.5 ML IM: CPT | Performed by: SURGERY

## 2021-10-28 PROCEDURE — 80053 COMPREHEN METABOLIC PANEL: CPT

## 2021-10-28 PROCEDURE — 83735 ASSAY OF MAGNESIUM: CPT

## 2021-10-28 PROCEDURE — 97116 GAIT TRAINING THERAPY: CPT

## 2021-10-28 PROCEDURE — 85025 COMPLETE CBC W/AUTO DIFF WBC: CPT

## 2021-10-28 RX ORDER — POTASSIUM CHLORIDE 20 MEQ/1
20 TABLET, EXTENDED RELEASE ORAL 2 TIMES DAILY
Qty: 3 TABLET | Refills: 0 | Status: SHIPPED | OUTPATIENT
Start: 2021-10-28

## 2021-10-28 RX ORDER — HYDROCODONE BITARTRATE AND ACETAMINOPHEN 5; 325 MG/1; MG/1
1 TABLET ORAL
Qty: 8 TABLET | Refills: 0 | Status: SHIPPED | OUTPATIENT
Start: 2021-10-28 | End: 2021-10-31

## 2021-10-28 RX ORDER — POTASSIUM CHLORIDE 20 MEQ/1
20 TABLET, EXTENDED RELEASE ORAL 2 TIMES DAILY
Status: DISCONTINUED | OUTPATIENT
Start: 2021-10-28 | End: 2021-10-28 | Stop reason: HOSPADM

## 2021-10-28 RX ORDER — AMOXICILLIN AND CLAVULANATE POTASSIUM 500; 125 MG/1; MG/1
1 TABLET, FILM COATED ORAL 2 TIMES DAILY
Qty: 10 TABLET | Refills: 0 | Status: SHIPPED | OUTPATIENT
Start: 2021-10-28 | End: 2021-12-09 | Stop reason: ALTCHOICE

## 2021-10-28 RX ORDER — POTASSIUM CHLORIDE 20 MEQ/1
20 TABLET, EXTENDED RELEASE ORAL
Status: COMPLETED | OUTPATIENT
Start: 2021-10-28 | End: 2021-10-28

## 2021-10-28 RX ORDER — ONDANSETRON 4 MG/1
8 TABLET, ORALLY DISINTEGRATING ORAL
Status: DISCONTINUED | OUTPATIENT
Start: 2021-10-28 | End: 2021-10-28 | Stop reason: HOSPADM

## 2021-10-28 RX ADMIN — PIPERACILLIN AND TAZOBACTAM 3.38 G: 3; .375 INJECTION, POWDER, LYOPHILIZED, FOR SOLUTION INTRAVENOUS at 03:25

## 2021-10-28 RX ADMIN — POTASSIUM CHLORIDE 20 MEQ: 1500 TABLET, EXTENDED RELEASE ORAL at 09:53

## 2021-10-28 RX ADMIN — AMLODIPINE BESYLATE 5 MG: 5 TABLET ORAL at 09:53

## 2021-10-28 RX ADMIN — INFLUENZA VIRUS VACCINE 0.5 ML: 15; 15; 15; 15 SUSPENSION INTRAMUSCULAR at 16:56

## 2021-10-28 RX ADMIN — POTASSIUM CHLORIDE 20 MEQ: 20 TABLET, EXTENDED RELEASE ORAL at 10:01

## 2021-10-28 RX ADMIN — PIPERACILLIN AND TAZOBACTAM 3.38 G: 3; .375 INJECTION, POWDER, LYOPHILIZED, FOR SOLUTION INTRAVENOUS at 09:53

## 2021-10-28 NOTE — PROGRESS NOTES
06:00  Assumed care for Tash RN. Resting quietly in bed with eyes closed    07:25 Bedside and Verbal shift change report given to [address] [place] [address] (oncoming nurse) by Nery Rendon RN (offgoing nurse). Report included the following information SBAR.

## 2021-10-28 NOTE — PROGRESS NOTES
Bedside and verbal shift change report given to Beth French RN (on coming nurse) by Saulo Stephen RN (off going nurse). Report included the following information SBAR, Kardex, OR Summary, Intake/Output and MAR.    1727  AVS review with pt, opportunity for questions and concerns at this time. D/c IV clean and dry. Properly discarded armbands.

## 2021-10-28 NOTE — PROGRESS NOTES
Problem: Mobility Impaired (Adult and Pediatric)  Goal: *Acute Goals and Plan of Care (Insert Text)  Description: Physical Therapy Goals   Initiated 10/27/2021 and to be accomplished within 2-3 day(s)  1. Patient will move from supine <> sit with S in prep for out of bed activity and change of position. 2.  Patient will perform sit<> stand with S with LRAD in prep for transfers/ambulation. 3.  Patient will transfer from bed <> chair with S with LRAD for time up in chair for completion of ADL activity. 4.  Patient will ambulate 150 feet with S/LRAD for improved functional mobility at discharge. 5.  Patient will ascend/descend 3-5 stairs with handrail(s) with minimal assistance/contact guard assist for home re-entry as needed. Outcome: Progressing Towards Goal   PHYSICAL THERAPY TREATMENT    Patient: Elizabeth Benavidez (9 y.o. male)  Date: 10/28/2021  Diagnosis: Abdominal pain [R10.9] Abdominal pain  Procedure(s) (LRB):  LAPAROSCOPIC CHOLECYSTECTOMY WITH LIVER BIOPSY (N/A) 3 Days Post-Op  Precautions: Fall  PLOF:     ASSESSMENT:  No assistance needed for bed mobility. CGA for sit to stand. Ambulated 220ft with RW, reciprocal gt pattern, decreased step height on (B)LEs, no LOB or path deviations. Pt left sitting EOB with daughter present. Progression toward goals:   [x]      Improving appropriately and progressing toward goals  []      Improving slowly and progressing toward goals  []      Not making progress toward goals and plan of care will be adjusted     PLAN:  Patient continues to benefit from skilled intervention to address the above impairments. Continue treatment per established plan of care.   Discharge Recommendations:  Home Health vs None  Further Equipment Recommendations for Discharge:  rolling walker     SUBJECTIVE:   Patient stated .    OBJECTIVE DATA SUMMARY:   Critical Behavior:  Neurologic State: Alert, Appropriate for age  Orientation Level: Oriented X4  Cognition: Appropriate for age attention/concentration, Appropriate safety awareness, Follows commands  Safety/Judgement: Fall prevention  Functional Mobility Training:  Bed Mobility:    Supine to Sit: Supervision  Transfers:  Sit to Stand: Contact guard assistance  Stand to Sit: Stand-by assistance  Balance:  Sitting: Intact  Standing: Intact; With support  Ambulation/Gait Training:  Distance (ft): 220 Feet (ft)  Assistive Device: Gait belt;Walker, rolling  Ambulation - Level of Assistance: Stand-by assistance  Gait Abnormalities: Decreased step clearance        Pain:  Pain level pre-treatment: 0/10  Pain level post-treatment: 0/10   Pain Intervention(s): Medication (see MAR); Rest, Ice, Repositioning   Response to intervention: Nurse notified, See doc flow    Activity Tolerance:   Good  Please refer to the flowsheet for vital signs taken during this treatment. After treatment:   [] Patient left in no apparent distress sitting up in chair  [x] Patient left in no apparent distress in bed  [x] Call bell left within reach  [] Nursing notified  [x] Caregiver present  [] Bed alarm activated  [] SCDs applied      COMMUNICATION/EDUCATION:   [x]         Role of Physical Therapy in the acute care setting. [x]         Fall prevention education was provided and the patient/caregiver indicated understanding. [x]         Patient/family have participated as able in working toward goals and plan of care. [x]         Patient/family agree to work toward stated goals and plan of care. []         Patient understands intent and goals of therapy, but is neutral about his/her participation.   []         Patient is unable to participate in stated goals/plan of care: ongoing with therapy staff.  []         Other:        Leland Vale PTA   Time Calculation: 11 mins

## 2021-10-28 NOTE — PROGRESS NOTES
Pt resting in bed, no distress, daughter by bedside.  Offered to change LARRY drain dressing - pt wanted it done later, gauze dressing left at bedside

## 2021-10-28 NOTE — DISCHARGE SUMMARY
Physician Discharge Summary     Patient ID:  Dee Keith  [id number]  22 y.o.  1935    Allergies: Patient has no known allergies. Admit Date: 10/24/2021    Discharge Date: 10/28/2021    * Admission Diagnoses: Abdominal pain [R10.9]    * Discharge Diagnoses:    Hospital Problems as of 10/28/2021 Date Reviewed: 10/24/2021        Codes Class Noted - Resolved POA    Abnormal liver ultrasound ICD-10-CM: R93.2  ICD-9-CM: 793.3  10/25/2021 - Present Clinically Undetermined        Calculus of gallbladder with acute on chronic cholecystitis without obstruction ICD-10-CM: K80.12  ICD-9-CM: 574.00, 574.10  10/25/2021 - Present Clinically Undetermined        * (Principal) Abdominal pain ICD-10-CM: R10.9  ICD-9-CM: 789.00  10/24/2021 - Present Unknown        Calculus of gallbladder and bile duct without cholecystitis ICD-10-CM: K80.70  ICD-9-CM: 574.90  10/24/2021 - Present Unknown        Renal mass ICD-10-CM: N28.89  ICD-9-CM: 593.9  10/24/2021 - Present Unknown        Hematuria ICD-10-CM: R31.9  ICD-9-CM: 599.70  10/24/2021 - Present Unknown        Nausea and vomiting ICD-10-CM: R11.2  ICD-9-CM: 787.01  10/24/2021 - Present Unknown        HTN (hypertension) ICD-10-CM: I10  ICD-9-CM: 401.9  10/24/2021 - Present Unknown        Hyperlipemia ICD-10-CM: E78.5  ICD-9-CM: 272.4  10/24/2021 - Present Unknown        Abnormal transaminases ICD-10-CM: R74.8  ICD-9-CM: 790.4  10/24/2021 - Present Yes               Admission Condition: Poor    * Discharge Condition: improved    * Procedures: Procedure(s):  LAPAROSCOPIC CHOLECYSTECTOMY WITH LIVER BIOPSY    * Hospital Course:   Normal hospital course for this procedure. Consults: General Surgery    Significant Diagnostic Studies: labs, CT, US    * Disposition: Home    Discharge Medications:   Current Discharge Medication List      START taking these medications    Details   potassium chloride (K-DUR, KLOR-CON) 20 mEq tablet Take 1 Tablet by mouth two (2) times a day.  Indications: low amount of potassium in the blood  Qty: 3 Tablet, Refills: 0  Start date: 10/28/2021      amoxicillin-clavulanate (Augmentin) 500-125 mg per tablet Take 1 Tablet by mouth two (2) times a day. Qty: 10 Tablet, Refills: 0  Start date: 10/28/2021         CONTINUE these medications which have CHANGED    Details   HYDROcodone-acetaminophen (Norco) 5-325 mg per tablet Take 1 Tablet by mouth every six (6) hours as needed for Pain for up to 3 days. Max Daily Amount: 4 Tablets. Qty: 8 Tablet, Refills: 0  Start date: 10/28/2021, End date: 10/31/2021    Associated Diagnoses: Calculus of gallbladder with acute on chronic cholecystitis without obstruction         CONTINUE these medications which have NOT CHANGED    Details   hydroCHLOROthiazide (HYDRODIURIL) 12.5 mg tablet Take 12.5 mg by mouth daily. STOP taking these medications       atorvastatin (LIPITOR) 20 mg tablet Comments:   Reason for Stopping:               * Follow-up Care/Patient Instructions: Activity: No lifting or Strenuous exercise for 2 weeks  Diet: Low fat, Low cholesterol  Wound Care: Reinforce dressing PRN    Follow-up Information     Follow up With Specialties Details Why Contact Ruddy Padilla MD Internal Medicine Schedule an appointment as soon as possible for a visit in 1 week  [address] [place] [address]      Mindy Ramey MD General Surgery Schedule an appointment as soon as possible for a visit in 1 week Care Mgmt submitted request on 10/26/2021 to MD's office (Dr. Donnie Boogie) for f/u appointment. Patient to contact MD's office upon discharge to confirm f/u appointment date/time.  Rony6 Peggy Cottrell MD Urology Schedule an appointment as soon as possible for a visit in 1 week kidney cysts [address] [facility]  [phone]        General Surgery f/u -- 3-4 weeks with my NP Chasity -- call office to schedule appt.     Follow-up tests/labs none    Signed:  Chani Sykes MD  10/28/2021  4:46 PM

## 2021-10-28 NOTE — DISCHARGE SUMMARY
[facility] SUMMARY    Name:  Kelly Mariee  MR#:   [id number]  :  1935  ACCOUNT #:  [de-identified]  ADMIT DATE:  10/24/2021  DISCHARGE DATE:  10/28/2021    ADDENDUM    Discharge summary includes that the patient was admitted from 10/24 to 10/28 for acute cholecystitis. He had a successful laparoscopic cholecystectomy. There is renal cyst seen also on the CT abdomen and pelvis that with an indeterminate posterior interpolar left renal structure and recommend non-emergent renal protocol CT for followup. I have discussed that with his daughter and recommended Urology followup and primary care followup as well and then the CT scan could be recommended and scheduled thereafter. Surgical pathology from the gallbladder surgery showed gangrenous acute cholecystitis, moderate-to-severe steatosis of the liver. The patient has been recovering well. Drain is in place which should be removed per Surgery later today. Vital signs are stable. Blood pressure is well controlled. He has been passing gas today. He has been mobilizing to the bathroom. His H and H are stable at 11.4 and 33.6, platelet count is normal.  Metabolic panel is within normal limits except potassium which is 3.3 and oral repletion has been ordered. LFTs are stable with an ALT of 78, AST of 83, and bilirubin total is 1. He had normal cardiac markers on admission. A chest x-ray also on the  showed no acute cardiopulmonary abnormality. The patient is resting comfortably today. He has had some food. His daughters at the bedside. There are no acute concerns. His pain is well controlled, he has had gas, and he is mobilizing. They would like him to go home today. We are awaiting surgical clearance. I wanted to make sure we dictated about the renal lesion seen also, it may just be cystic lesions at this time. Recommend followup with Urology in the next month and CT scan before then to evaluate that further. Followup with Dr. Lesa Arshad; follow up with this physician in 1 week which is the primary care doctor; and schedule an appoint with Dr. Sven Aly surgery in 1-2 weeks as well. Followup Surgery. The patient is stable for release from the hospital once cleared by Surgery and drain management recommendations are made if necessary. The patient can be discharged with pain medication and antibiotics as per surgery team per their request and recommend otherwise K-Dur 20 mEq one tablet twice daily for three tablets. Hold his Lipitor for now until LFTs normalize. Continue hydrochlorothiazide 12.5 mg daily. Further discharge instructions per Surgery and medical team.      Noelle Kaur MD      [id number]  D:  10/28/2021 13:36  T:  10/28/2021 13:53  JOB #:  [id number]  CC:  Clive Burk.  REKHA Kenyon

## 2021-10-28 NOTE — PROGRESS NOTES
Hospitalist Progress Note    Patient: Jemma Jalloh MRN: [id number]  CSN: [id number]    YOB: 1935  Age: 82 y.o. Sex: male    DOA: 10/24/2021 LOS:  LOS: 4 days          Chief Complaint:    cholecystitis      Assessment/Plan     82 y. o. male Greenlandic-speaking, with past medical history of hypertension, hyperlipidemia who was brought by EMS today for 2 days history of constant abdominal pain, N/V.     Hypokalemia-PO Kdur     Cholelithiasis/cholecystitis -  Seen by general surgery  s/p sushant mirtha for gangrenous cholecystitis  On zosyn-change to PO for d/c  Final d/c per surgery team      HTN -   Amlodipine PO.      Left Renal cysts, ?  Lesion, with hematuria -   Need to follow up with urology  Also, repeat CT scan , renal dedicated in follow up  Discussed with daughter, advised follow up with PCP in 1-2 weeks and set CT scan up after that-will include in d/c summary also     Drain as per surgery     Discussed with daughter at bedside.     Disposition :  Patient Active Problem List   Diagnosis Code    Abdominal pain R10.9    Calculus of gallbladder and bile duct without cholecystitis K80.70    Renal mass N28.89    Hematuria R31.9    Nausea and vomiting R11.2    HTN (hypertension) I10    Hyperlipemia E78.5    Abnormal liver ultrasound R93.2    Calculus of gallbladder with acute on chronic cholecystitis without obstruction K80.12    Abnormal transaminases R74.8       Subjective:    Doing well  Has had gas  Walked to bathroom  Ate a little bit  No ucontrolled pain  Ready to go home    Review of systems:    Constitutional: denies fevers, chills  Respiratory: denies SOB  Cardiovascular: denies chest pain  Gastrointestinal: denies nausea, vomiting, diarrhea      Vital signs/Intake and Output:  Visit Vitals  BP (!) 145/71 (BP 1 Location: Left upper arm, BP Patient Position: At rest)   Pulse 72   Temp 97.7 °F (36.5 °C)   Resp 18   Ht 5' 6\" (1.676 m)   Wt 73.7 kg (162 lb 6.4 oz)   SpO2 98%   BMI 26.21 kg/m²     Current Shift:  No intake/output data recorded. Last three shifts:  10/26 1901 - 10/28 0700  In: 200 [I.V.:200]  Out: 30 [Drains:30]    Exam:    General: elderly Danish speaking male, alert, NAD  CVS:Regular rate and rhythm, no M/R/G, S1/S2 heard, no thrill  Lungs:Clear to auscultation bilaterally, no wheezes, rhonchi, or rales  Abdomen: Soft, Nontender, mild distention, drain RUQ  Extremities: No C/C/E, pulses palpable 2+  Neuro:grossly normal , follows commands  Psych:appropriate                Labs: Results:       Chemistry Recent Labs     10/28/21  0105 10/27/21  0310 10/26/21  0200   * 116* 127*    139 138   K 3.3* 3.3* 3.7    106 106   CO2 27 28 24   BUN 12 13 15   CREA 0.47* 0.54* 0.62   CA 7.7* 7.8* 7.7*   AGAP 4 5 8   BUCR 26* 24* 24*   AP 96 80 90   TP 5.3* 5.3* 5.7*   ALB 2.0* 2.1* 2.4*   GLOB 3.3 3.2 3.3   AGRAT 0.6* 0.7* 0.7*      CBC w/Diff Recent Labs     10/28/21  0105 10/27/21  0310 10/26/21  0200   WBC 10.9 13.2 16.8*   RBC 3.70* 3.82* 4.19*   HGB 11.4* 11.7* 13.1   HCT 33.6* 35.2* 39.8    180 176   GRANS 70 76* 83*   LYMPH 13* 9* 4*   EOS 2 0 0      Cardiac Enzymes No results for input(s): CPK, CKND1, MOHSEN in the last 72 hours. No lab exists for component: CKRMB, TROIP   Coagulation No results for input(s): PTP, INR, APTT, INREXT in the last 72 hours. Lipid Panel No results found for: CHOL, CHOLPOCT, CHOLX, CHLST, CHOLV, 149114, HDL, HDLP, LDL, LDLC, DLDLP, 042845, VLDLC, VLDL, TGLX, TRIGL, TRIGP, TGLPOCT, CHHD, CHHDX   BNP No results for input(s): BNPP in the last 72 hours.    Liver Enzymes Recent Labs     10/28/21  0105   TP 5.3*   ALB 2.0*   AP 96      Thyroid Studies No results found for: T4, T3U, TSH, TSHEXT     Procedures/imaging: see electronic medical records for all procedures/Xrays and details which were not copied into this note but were reviewed prior to creation of Erlinda Alvarez MD

## 2021-10-28 NOTE — PROGRESS NOTES
D/C Plan: Home with physician follow up and persoanal care     Pt was not on O2 prior to admission. Please wean O2 as appropriate andand encourage use of the incentive spirometer to assist with transition of care. Please continue to encourage ambulation. CM to continue to follow and assist with transition of care. Care Management Interventions  Mode of Transport at Discharge:  Other (see comment) (Family)  Transition of Care Consult (CM Consult): Discharge Planning  Health Maintenance Reviewed: Yes  Physical Therapy Consult: Yes  Support Systems: Spouse/Significant Other, Child(leno), Other Family Member(s)  The Plan for Transition of Care is Related to the Following Treatment Goals : Home with family support and personal care and physician follow up      Discharge Location  Discharge Placement: Home with family assistance (Home with family support and personal care and physician follow up         )

## 2021-10-28 NOTE — PROGRESS NOTES
POD#3  Pt feels much better, no sig pain. Debra clears, no nausea, + flatus w several BMs. Has been ambulating in sen. AVSS    LARRY 10 overnight -- removed today    RRR  CTA  Softer, BS present, incisions c/d/i  No calf tender    WBC 10.9, BMP ok, Bili 1.0    Stable s/p lap mirtha, liver bx  Low fat diet  PO cipro/flagyl x 1 week  OOB/spirometer  D/C LARRY  D/C pt to home -- f/u in office with my NP in 3-4 weeks.

## 2021-10-28 NOTE — ADT AUTH CERT NOTES
Cholecystectomy by Laparoscopy - Care Day 3 (10/27/2021) by Alexandro Rivera RN       Review Status Review Entered   Completed 10/28/2021 13:17      Criteria Review      Care Day: 3 Care Date: 10/27/2021 Level of Care: Inpatient Floor    Guideline Day 1    Clinical Status    (X) * Successful uncomplicated cholecystectomy    10/28/2021 13:16:10 EDT by Sergey Alvarado      SURGERY COMPLETED 10/25    ( ) * Nausea absent or managed postoperatively    ( ) * Pain absent or managed postoperatively    ( ) * No evidence of infection    ( ) * Discharge plans and education understood    Activity    ( ) * Ambulatory or acceptable for next level of care    Routes    ( ) * Oral hydration    ( ) * Oral medications or regimen acceptable for next level of care    10/28/2021 13:16:56 EDT by Sergey Median      potassium chloride (K-DUR, KLOR-CON) SR tablet 40 mEq   Dose: 40 mEq  Freq: NOW Route: PO    10/28/2021 13:16:45 EDT by Sergey Median      piperacillin-tazobactam (ZOSYN) 3.375 g in 0.9% sodium chloride (MBP/ADV) 100 mL MBP   Dose: 3.375 g  Freq: EVERY 6 HOURS Route: IV    10/28/2021 13:16:34 EDT by Sergey Median      amLODIPine (NORVASC) tablet 5 mg   Dose: 5 mg  Freq: DAILY Route: PO    (X) * Oral diet or acceptable for next level of care    10/28/2021 13:17:26 EDT by Sergey Median      ADULT DIET Regular; Low Fat/Low Chol/High Fiber/CLAIRE    Medications    (X) Preoperative antibiotic    (X) Possible antiemetic regimen    (X) Analgesics    * Milestone   Additional Notes   10/27/21      INTERNAL MEDICINE:   Assessment/Plan       Patient Active Problem List   Diagnosis Code   · Abdominal pain R10.9   · Calculus of gallbladder and bile duct without cholecystitis K80.70   · Renal mass N28.89   · Hematuria R31.9   · Nausea and vomiting R11.2   · HTN (hypertension) I10   · Hyperlipemia E78.5   · Abnormal liver ultrasound R93.2   · Calculus of gallbladder with acute on chronic cholecystitis without obstruction K80.12   · Abnormal transaminases R74.8           Chief complaint :   82 y. o. male Romanian-speaking, with past medical history of hypertension, hyperlipidemia who was brought by EMS today for 2 days history of constant abdominal pain, N/V.       Pain much better, +flatus       Cholelithiasis/cholecystitis -   Seen by general surgery   s/p lap mirtha   On zosyn   Post op diet per general surgery           HTN -    Amlodipine PO. Pain control. Monitor BP closely       Left Renal mass with hematuria -    Need to follow up with urology .       Recommend continued hospitalization per general surgery. Still has drain in place.       Discussed with daughter at bedside.           Disposition : 1-2 days       Review of systems   General: No fevers or chills. Cardiovascular: No chest pain or pressure. No palpitations. Pulmonary: No shortness of breath. Gastrointestinal: see above.            Physical Exam:   General:         Awake, cooperative, no acute distress     HEENT:           NC, Atraumatic.  PERRLA, anicteric sclerae. Lungs:            CTA Bilaterally. No Wheezing/Rhonchi/Rales. Heart:              S1 S2,  No murmur, No Rubs, No Gallops   Abdomen:      Soft, Non distended, gen tender.  +Bowel sounds,    Extremities:   No c/c/e   Psych:            Not anxious or agitated. Neurologic:    No acute neurological deficit.                  Vital signs/Intake and Output:   Visit Vitals   /66   Pulse 92   Temp 97.8 °F (36.6 °C)   Resp 16   Ht 5' 6\" (1.676 m)   Wt 73.7 kg (162 lb 6.4 oz)   SpO2 100%   BMI 26.21 kg/m²            GENERAL SURGERY:   POD#2   Pt feels much better, no sig pain.  Robert clears, no nausea, + flatus.  Has been ambulating in sen.    AVSS       LARRY 70 serosang/24h       RRR   CTA   Softly distended, some BS present, incisions c/d/i, LARRY w serosang   No calf tender       WBC 13.2, BMP ok, Bili 1.0       Stable s/p lap mirtha, liver bx   Low fat diet   Cont IV abx   OOB/spirometer   Keep LARRY at least until robert reg diet for 24+ hours   Repeat labs in am                   10/27/2021 03:10   RBC: 3.82 (L)   HGB: 11.7 (L)   HCT: 35.2 (L)   NEUTROPHILS: 76 (H)   LYMPHOCYTES: 9 (L)   MONOCYTES: 13 (H)   ABS. NEUTROPHILS: 10.0 (H)   ABS.  MONOCYTES: 1.7 (H)   Potassium: 3.3 (L)   Glucose: 116 (H)   Creatinine: 0.54 (L)   BUN/Creatinine ratio: 24 (H)   Calcium: 7.8 (L)   Protein, total: 5.3 (L)   Albumin: 2.1 (L)   A-G Ratio: 0.7 (L)   ALT: 67 (H)   AST: 64 (H)            Cholecystectomy by Laparoscopy - Care Day 1 (10/25/2021) by Cinthya Campos RN       Review Status Review Entered   Completed 10/28/2021 13:13      Criteria Review      Care Day: 1 Care Date: 10/25/2021 Level of Care: Inpatient Floor    Guideline Day 1    Clinical Status    (X) * Successful uncomplicated cholecystectomy    10/28/2021 13:08:15 EDT by Christa Reza 1.  Laparoscopic cholecystectomy (modifier 22 for extremely inflamed nature of gallbladder requiring at least 50% extra operative time adding both operative time and technical difficulty requiring opening of extra instrumentation to the overall case    ( ) * Nausea absent or managed postoperatively    ( ) * Pain absent or managed postoperatively    ( ) * No evidence of infection    ( ) * Discharge plans and education understood    Activity    ( ) * Ambulatory or acceptable for next level of care    Routes    (X) IV fluids for procedure    10/28/2021 13:12:52 EDT by Topher Lambert      0.9% sodium chloride infusion   Rate: 100 mL/hr Dose: 100 mL/hr  Freq: CONTINUOUS Route: IV  Start: 10/24/21 1700 End: 10/25/21 1825    ( ) * Oral hydration    10/28/2021 13:13:05 EDT by Topher Lambert      lactated Ringers infusion   Rate: 50 mL/hr Dose: 50 mL/hr  Freq: CONTINUOUS Route: IV    ( ) * Oral medications or regimen acceptable for next level of care    10/28/2021 13:12:30 EDT by Topher Lambert      piperacillin-tazobactam (ZOSYN) 3.375 g in 0.9% sodium chloride (MBP/ADV) 100 mL MBP   Dose: 3.375 g  Freq: EVERY 6 HOURS Route: IV 10/28/2021 13:12:18 EDT by Myriam Cam      amLODIPine (NORVASC) tablet 5 mg   Dose: 5 mg  Freq: DAILY Route: PO    ( ) * Oral diet or acceptable for next level of care    Medications    (X) Preoperative antibiotic    10/28/2021 13:12:52 EDT by Myriam Cam      ceFAZolin (ANCEF) 2 g/20 mL in sterile water IV syringe   Dose: 2 g  Freq: ONCE Route: IV    (X) Possible antiemetic regimen    10/28/2021 13:13:35 EDT by Myriam Cam      ondansetron (ZOFRAN) injection 8 mg   Dose: 8 mg  Freq: EVERY 6 HOURS AS NEEDED Route: IV X1    (X) Analgesics    10/28/2021 13:13:16 EDT by Myriam Cam      morphine injection 4 mg   Dose: 4 mg  Freq: EVERY 4 HOURS AS NEEDED Route: IVX2    * Milestone   Additional Notes   10/25/21      INTERNAL MEDICINE:   Assessment/Plan       Patient Active Problem List   Diagnosis Code   · Abdominal pain R10.9   · Calculus of gallbladder and bile duct without cholecystitis K80.70   · Renal mass N28.89   · Hematuria R31.9   · Nausea and vomiting R11.2   · HTN (hypertension) I10   · Hyperlipemia E78.5   · Abnormal liver ultrasound R93. 2           Chief complaint :   82 y. o. male Algerian-speaking, with past medical history of hypertension, hyperlipidemia who was brought by EMS today for 2 days history of constant abdominal pain, N/V.       Abdominal pain -    Possible secondary to his cholelithiasis +/- cholecystitis .  NPO,  IVF. Morphine prn for pain   Seen by general surgery   Plan for lap mirtha       Pre op eval -   Discussed with patient and daughter at bedside. No history of heart disease. He walks around his neighborhood, no chest pain or SOB. His EKG with LBBB, no change from EKG in 2015,   CXR with no acute cardiopulmonary abnormality. Low risk for surgery.       HTN -    Amlodipine PO. Pain control. Monitor BP closely       Left Renal mass with hematuria -    Need to follow up with urology .           Disposition : TBD       Review of systems   General: No fevers or chills. Cardiovascular: No chest pain or pressure. No palpitations. Pulmonary: No shortness of breath. Gastrointestinal: see above.            Physical Exam:   General:         Awake, cooperative, no acute distress     HEENT:           NC, Atraumatic.  PERRLA, anicteric sclerae. Lungs:            CTA Bilaterally. No Wheezing/Rhonchi/Rales. Heart:              S1 S2,  No murmur, No Rubs, No Gallops   Abdomen:      Soft, Non distended, RUQ tender.  +Bowel sounds,    Extremities:   No c/c/e   Psych:            Not anxious or agitated. Neurologic:    No acute neurological deficit.                  Vital signs/Intake and Output:   Visit Vitals   BP (!) 143/68   Pulse (!) 102   Temp 97.1 °F (36.2 °C)   Resp 29   Ht 5' 6\" (1.676 m)   Wt 74.6 kg (164 lb 8 oz)   SpO2 97%   BMI 26.55 kg/m²             Brief Postoperative Note       Patient: Kalli Singh   YOB: 1935   MRN: [id number]       Date of Procedure: 10/25/2021        Pre-Op Diagnosis: CHOLELITHIASIS, ELEVATED LIVER TRANSAMINASE, ABNORMAL LIVER ULTRASOUND       Post-Op Diagnosis: CHOLECYSTITIS/CHOLELITHIASIS, ELEVATED LIVER TRANSAMINASE, ABNORMAL LIVER ULTRASOUND         Procedure(s):   LAPAROSCOPIC CHOLECYSTECTOMY (MOD 22), LIVER WEDGE BIOPSY       Surgeon(s):   Adria Dancer, MD       Surgical Assistant: None       Anesthesia: General        Estimated Blood Loss (mL): less than 530        Complications: None       Specimens:       ID Type Source Tests Collected by Time Destination   1 : GALLBLADDER AND CONTENTS Preservative Gallbladder   Adria Dancer, MD 10/25/2021 1651 Pathology   2 : [facility] LIVER BIOPSY Preservative Liver   Adria Dancer, MD 10/25/2021 1537 Pathology           Implants: * No implants in log *       Drains:       Amador-Roche Drain 10/25/21 Right;Mid Abdomen (Active)   Site Assessment Clean, dry, & intact 10/25/21 1724   Dressing Status Clean, dry, & intact 10/25/21 1724   Status Charged; Patent 10/25/21 1724   Drainage Color Serosanguinous 10/25/21 1724           Findings: SEVERE CHOLECYSTITIS       Electronically Signed by Nita Christopher MD on 10/25/2021 at 5:47 PM       Op note dictated #[id number]   RP

## 2021-12-09 ENCOUNTER — APPOINTMENT (OUTPATIENT)
Dept: GENERAL RADIOLOGY | Age: 86
End: 2021-12-09
Attending: EMERGENCY MEDICINE
Payer: MEDICAID

## 2021-12-09 ENCOUNTER — APPOINTMENT (OUTPATIENT)
Dept: CT IMAGING | Age: 86
End: 2021-12-09
Attending: EMERGENCY MEDICINE
Payer: MEDICAID

## 2021-12-09 ENCOUNTER — HOSPITAL ENCOUNTER (EMERGENCY)
Age: 86
Discharge: HOME | End: 2021-12-09
Attending: EMERGENCY MEDICINE
Payer: MEDICAID

## 2021-12-09 VITALS
SYSTOLIC BLOOD PRESSURE: 137 MMHG | OXYGEN SATURATION: 97 % | HEART RATE: 60 BPM | RESPIRATION RATE: 13 BRPM | WEIGHT: 158 LBS | HEIGHT: 72 IN | TEMPERATURE: 97.7 F | BODY MASS INDEX: 21.4 KG/M2 | DIASTOLIC BLOOD PRESSURE: 66 MMHG

## 2021-12-09 DIAGNOSIS — K91.872 POSTOPERATIVE SEROMA INVOLVING DIGESTIVE SYSTEM AFTER DIGESTIVE SYSTEM PROCEDURE: Primary | ICD-10-CM

## 2021-12-09 DIAGNOSIS — R11.2 NON-INTRACTABLE VOMITING WITH NAUSEA, UNSPECIFIED VOMITING TYPE: ICD-10-CM

## 2021-12-09 DIAGNOSIS — D72.829 LEUKOCYTOSIS, UNSPECIFIED TYPE: ICD-10-CM

## 2021-12-09 LAB
ALBUMIN SERPL-MCNC: 3.6 G/DL (ref 3.4–5)
ALBUMIN/GLOB SERPL: 0.8 {RATIO} (ref 0.8–1.7)
ALP SERPL-CCNC: 169 U/L (ref 45–117)
ALT SERPL-CCNC: 28 U/L (ref 16–61)
ANION GAP SERPL CALC-SCNC: 10 MMOL/L (ref 3–18)
APTT PPP: 28.1 SEC (ref 23–36.4)
AST SERPL-CCNC: 18 U/L (ref 10–38)
BASOPHILS # BLD: 0.1 K/UL (ref 0–0.1)
BASOPHILS NFR BLD: 0 % (ref 0–2)
BILIRUB SERPL-MCNC: 0.5 MG/DL (ref 0.2–1)
BUN SERPL-MCNC: 17 MG/DL (ref 7–18)
BUN/CREAT SERPL: 22 (ref 12–20)
CALCIUM SERPL-MCNC: 9.3 MG/DL (ref 8.5–10.1)
CHLORIDE SERPL-SCNC: 101 MMOL/L (ref 100–111)
CK MB CFR SERPL CALC: ABNORMAL % (ref 0–4)
CK MB SERPL-MCNC: <1 NG/ML (ref 5–25)
CK SERPL-CCNC: 23 U/L (ref 39–308)
CO2 SERPL-SCNC: 28 MMOL/L (ref 21–32)
CREAT SERPL-MCNC: 0.76 MG/DL (ref 0.6–1.3)
DIFFERENTIAL METHOD BLD: ABNORMAL
EOSINOPHIL # BLD: 0 K/UL (ref 0–0.4)
EOSINOPHIL NFR BLD: 0 % (ref 0–5)
ERYTHROCYTE [DISTWIDTH] IN BLOOD BY AUTOMATED COUNT: 12.9 % (ref 11.6–14.5)
GLOBULIN SER CALC-MCNC: 4.4 G/DL (ref 2–4)
GLUCOSE SERPL-MCNC: 160 MG/DL (ref 74–99)
HCT VFR BLD AUTO: 44.4 % (ref 36–48)
HGB BLD-MCNC: 14.7 G/DL (ref 13–16)
IMM GRANULOCYTES # BLD AUTO: 0.1 K/UL (ref 0–0.04)
IMM GRANULOCYTES NFR BLD AUTO: 1 % (ref 0–0.5)
INR PPP: 1 (ref 0.8–1.2)
LIPASE SERPL-CCNC: 78 U/L (ref 73–393)
LYMPHOCYTES # BLD: 1.9 K/UL (ref 0.9–3.6)
LYMPHOCYTES NFR BLD: 13 % (ref 21–52)
MAGNESIUM SERPL-MCNC: 2 MG/DL (ref 1.6–2.6)
MCH RBC QN AUTO: 29.8 PG (ref 24–34)
MCHC RBC AUTO-ENTMCNC: 33.1 G/DL (ref 31–37)
MCV RBC AUTO: 89.9 FL (ref 78–100)
MONOCYTES # BLD: 1.3 K/UL (ref 0.05–1.2)
MONOCYTES NFR BLD: 9 % (ref 3–10)
NEUTS SEG # BLD: 11.2 K/UL (ref 1.8–8)
NEUTS SEG NFR BLD: 77 % (ref 40–73)
NRBC # BLD: 0 K/UL (ref 0–0.01)
NRBC BLD-RTO: 0 PER 100 WBC
PLATELET # BLD AUTO: 331 K/UL (ref 135–420)
PMV BLD AUTO: 9.9 FL (ref 9.2–11.8)
POTASSIUM SERPL-SCNC: 3.7 MMOL/L (ref 3.5–5.5)
PROT SERPL-MCNC: 8 G/DL (ref 6.4–8.2)
PROTHROMBIN TIME: 13 SEC (ref 11.5–15.2)
RBC # BLD AUTO: 4.94 M/UL (ref 4.35–5.65)
SODIUM SERPL-SCNC: 139 MMOL/L (ref 136–145)
TROPONIN I SERPL-MCNC: <0.02 NG/ML (ref 0–0.04)
WBC # BLD AUTO: 14.6 K/UL (ref 4.6–13.2)

## 2021-12-09 PROCEDURE — 96365 THER/PROPH/DIAG IV INF INIT: CPT

## 2021-12-09 PROCEDURE — 74011000636 HC RX REV CODE- 636: Performed by: EMERGENCY MEDICINE

## 2021-12-09 PROCEDURE — 85730 THROMBOPLASTIN TIME PARTIAL: CPT

## 2021-12-09 PROCEDURE — 83690 ASSAY OF LIPASE: CPT

## 2021-12-09 PROCEDURE — 71045 X-RAY EXAM CHEST 1 VIEW: CPT

## 2021-12-09 PROCEDURE — 83735 ASSAY OF MAGNESIUM: CPT

## 2021-12-09 PROCEDURE — 80053 COMPREHEN METABOLIC PANEL: CPT

## 2021-12-09 PROCEDURE — 74011250636 HC RX REV CODE- 250/636: Performed by: EMERGENCY MEDICINE

## 2021-12-09 PROCEDURE — 82553 CREATINE MB FRACTION: CPT

## 2021-12-09 PROCEDURE — 85610 PROTHROMBIN TIME: CPT

## 2021-12-09 PROCEDURE — 93005 ELECTROCARDIOGRAM TRACING: CPT

## 2021-12-09 PROCEDURE — 99285 EMERGENCY DEPT VISIT HI MDM: CPT

## 2021-12-09 PROCEDURE — 74011000258 HC RX REV CODE- 258: Performed by: EMERGENCY MEDICINE

## 2021-12-09 PROCEDURE — 85025 COMPLETE CBC W/AUTO DIFF WBC: CPT

## 2021-12-09 PROCEDURE — 74177 CT ABD & PELVIS W/CONTRAST: CPT

## 2021-12-09 RX ORDER — HYDROCODONE BITARTRATE AND ACETAMINOPHEN 5; 325 MG/1; MG/1
1 TABLET ORAL
Qty: 12 TABLET | Refills: 0 | Status: SHIPPED | OUTPATIENT
Start: 2021-12-09 | End: 2021-12-12

## 2021-12-09 RX ORDER — CEFDINIR 300 MG/1
300 CAPSULE ORAL 2 TIMES DAILY
Qty: 14 CAPSULE | Refills: 0 | Status: SHIPPED | OUTPATIENT
Start: 2021-12-09 | End: 2021-12-16

## 2021-12-09 RX ORDER — ONDANSETRON 4 MG/1
4 TABLET, FILM COATED ORAL
Qty: 12 TABLET | Refills: 0 | Status: SHIPPED | OUTPATIENT
Start: 2021-12-09

## 2021-12-09 RX ADMIN — IOPAMIDOL 100 ML: 612 INJECTION, SOLUTION INTRAVENOUS at 04:14

## 2021-12-09 RX ADMIN — PIPERACILLIN AND TAZOBACTAM 4.5 G: 4; .5 INJECTION, POWDER, LYOPHILIZED, FOR SOLUTION INTRAVENOUS at 05:47

## 2021-12-09 NOTE — ED NOTES
I have reviewed discharge instructions with the patient and next of kin. The patient and next of kin verbalized understanding.   Patient armband removed and shredded

## 2021-12-09 NOTE — ED TRIAGE NOTES
Patient brought to ED by [facility] c/o vomitin for 24+ hours. Per ems, family reported episode of coffee-ground emesis which prompted them to call 911. Patient also c/o upper abdominal discomfort.

## 2021-12-09 NOTE — ED PROVIDER NOTES
EMERGENCY DEPARTMENT HISTORY AND PHYSICAL EXAM    Date: 12/9/2021  Patient Name: Emilio Crocker    History of Presenting Illness     Chief Complaint   Patient presents with    Vomiting         History Provided By: Patient and EMS    Additional History (Context):   3:15 AM  Emilio Crocker is a 82 y.o. male with PMHX of choledocholithiasis status post cholecystectomy on October 25, 2021, renal cysts, hypertension, hyperlipidemia who presents to the emergency department C/O abdominal pain and vomiting. States she has been having problems with vomiting off and on for the past 24 hours. Some of the bouts of emesis look to have coffee grinds and the contents. She subsequently been having epigastric pain after multiple episodes of vomiting. There is no fevers chills shortness of breath or diarrhea. She does not take blood thinners on a regular basis no pain medications or antiemetics. Since going home from her surgery she been doing relatively well. She does not drink alcohol use NSAIDs. Social History  No smoking drinking or drugs    Family History  No history of bleeding disorders or GI problems. There is some high blood pressure in her family. PCP: Caro Charles MD    Current Outpatient Medications   Medication Sig Dispense Refill    potassium chloride (K-DUR, KLOR-CON) 20 mEq tablet Take 1 Tablet by mouth two (2) times a day. Indications: low amount of potassium in the blood 3 Tablet 0    amoxicillin-clavulanate (Augmentin) 500-125 mg per tablet Take 1 Tablet by mouth two (2) times a day. 10 Tablet 0    hydroCHLOROthiazide (HYDRODIURIL) 12.5 mg tablet Take 12.5 mg by mouth daily. Past History     Past Medical History:  No past medical history on file. Past Surgical History:  No past surgical history on file. Family History:  No family history on file.     Social History:  Social History     Tobacco Use    Smoking status: Former Smoker    Smokeless tobacco: Not on file Substance Use Topics    Alcohol use: No    Drug use: Not on file       Allergies:  No Known Allergies      Review of Systems   Review of Systems   Constitutional: Positive for appetite change. Gastrointestinal: Positive for abdominal pain, nausea and vomiting. Negative for anal bleeding and blood in stool. Musculoskeletal: Negative for neck pain. All other systems reviewed and are negative. Physical Exam     Vitals:    12/09/21 0419 12/09/21 0449 12/09/21 0504 12/09/21 0519   BP: (!) 147/72 134/66 138/68 137/66   Pulse: 73 72 68 60   Resp: 16 14 23 13   Temp:       SpO2: 100% 97% 97% 97%   Weight:       Height:         Physical Exam  Vitals and nursing note reviewed. Constitutional:       General: He is not in acute distress. Appearance: He is well-developed. He is not diaphoretic. HENT:      Head: Normocephalic and atraumatic. Eyes:      General: No scleral icterus. Extraocular Movements:      Right eye: Normal extraocular motion. Left eye: Normal extraocular motion. Conjunctiva/sclera: Conjunctivae normal.      Pupils: Pupils are equal, round, and reactive to light. Neck:      Trachea: No tracheal deviation. Cardiovascular:      Rate and Rhythm: Normal rate and regular rhythm. Heart sounds: Normal heart sounds. Pulmonary:      Effort: Pulmonary effort is normal. No respiratory distress. Breath sounds: Normal breath sounds. No stridor. Abdominal:      General: Bowel sounds are normal. There is no distension. Palpations: Abdomen is soft. Tenderness: There is abdominal tenderness. There is no rebound. Comments: Patient is diffusely tender greatest in the epigastrium. She has well-healed surgical incisions without redness swelling. There seems to be some voluntary guarding but otherwise unremarkable. Musculoskeletal:         General: No tenderness. Normal range of motion. Cervical back: Normal range of motion and neck supple.       Comments: Grossly unremarkable without abnormalities   Skin:     General: Skin is warm and dry. Capillary Refill: Capillary refill takes less than 2 seconds. Findings: No erythema or rash. Neurological:      Mental Status: He is alert and oriented to person, place, and time. Cranial Nerves: No cranial nerve deficit. Motor: No weakness. Psychiatric:         Attention and Perception: Attention normal.         Mood and Affect: Mood normal.         Speech: Speech normal.         Behavior: Behavior normal.         Thought Content: Thought content normal.         Judgment: Judgment normal.       Diagnostic Study Results     Labs -  Recent Results (from the past 24 hour(s))   EKG, 12 LEAD, INITIAL    Collection Time: 12/09/21  2:45 AM   Result Value Ref Range    Ventricular Rate 73 BPM    Atrial Rate 73 BPM    P-R Interval 156 ms    QRS Duration 138 ms    Q-T Interval 454 ms    QTC Calculation (Bezet) 500 ms    Calculated P Axis 52 degrees    Calculated R Axis 70 degrees    Calculated T Axis 173 degrees    Diagnosis       Normal sinus rhythm  Left ventricular hypertrophy with QRS widening ( Sokolow-Montes , Jorge   product )  Nonspecific T wave abnormality  Abnormal ECG  When compared with ECG of 24-OCT-2021 11:34,  Left bundle branch block is no longer present     CBC WITH AUTOMATED DIFF    Collection Time: 12/09/21  2:59 AM   Result Value Ref Range    WBC 14.6 (H) 4.6 - 13.2 K/uL    RBC 4.94 4.35 - 5.65 M/uL    HGB 14.7 13.0 - 16.0 g/dL    HCT 44.4 36.0 - 48.0 %    MCV 89.9 78.0 - 100.0 FL    MCH 29.8 24.0 - 34.0 PG    MCHC 33.1 31.0 - 37.0 g/dL    RDW 12.9 11.6 - 14.5 %    PLATELET 976 950 - 969 K/uL    MPV 9.9 9.2 - 11.8 FL    NRBC 0.0 0  WBC    ABSOLUTE NRBC 0.00 0.00 - 0.01 K/uL    NEUTROPHILS 77 (H) 40 - 73 %    LYMPHOCYTES 13 (L) 21 - 52 %    MONOCYTES 9 3 - 10 %    EOSINOPHILS 0 0 - 5 %    BASOPHILS 0 0 - 2 %    IMMATURE GRANULOCYTES 1 (H) 0.0 - 0.5 %    ABS.  NEUTROPHILS 11.2 (H) 1.8 - 8.0 K/UL    ABS. LYMPHOCYTES 1.9 0.9 - 3.6 K/UL    ABS. MONOCYTES 1.3 (H) 0.05 - 1.2 K/UL    ABS. EOSINOPHILS 0.0 0.0 - 0.4 K/UL    ABS. BASOPHILS 0.1 0.0 - 0.1 K/UL    ABS. IMM. GRANS. 0.1 (H) 0.00 - 0.04 K/UL    DF AUTOMATED     METABOLIC PANEL, COMPREHENSIVE    Collection Time: 12/09/21  2:59 AM   Result Value Ref Range    Sodium 139 136 - 145 mmol/L    Potassium 3.7 3.5 - 5.5 mmol/L    Chloride 101 100 - 111 mmol/L    CO2 28 21 - 32 mmol/L    Anion gap 10 3.0 - 18 mmol/L    Glucose 160 (H) 74 - 99 mg/dL    BUN 17 7.0 - 18 MG/DL    Creatinine 0.76 0.6 - 1.3 MG/DL    BUN/Creatinine ratio 22 (H) 12 - 20      GFR est AA >60 >60 ml/min/1.73m2    GFR est non-AA >60 >60 ml/min/1.73m2    Calcium 9.3 8.5 - 10.1 MG/DL    Bilirubin, total 0.5 0.2 - 1.0 MG/DL    ALT (SGPT) 28 16 - 61 U/L    AST (SGOT) 18 10 - 38 U/L    Alk. phosphatase 169 (H) 45 - 117 U/L    Protein, total 8.0 6.4 - 8.2 g/dL    Albumin 3.6 3.4 - 5.0 g/dL    Globulin 4.4 (H) 2.0 - 4.0 g/dL    A-G Ratio 0.8 0.8 - 1.7     LIPASE    Collection Time: 12/09/21  2:59 AM   Result Value Ref Range    Lipase 78 73 - 393 U/L   CARDIAC PANEL,(CK, CKMB & TROPONIN)    Collection Time: 12/09/21  2:59 AM   Result Value Ref Range    CK - MB <1.0 <3.6 ng/ml    CK-MB Index  0.0 - 4.0 %     CALCULATION NOT PERFORMED WHEN RESULT IS BELOW LINEAR LIMIT    CK 23 (L) 39 - 308 U/L    Troponin-I, QT <0.02 0.0 - 0.045 NG/ML   PROTHROMBIN TIME + INR    Collection Time: 12/09/21  2:59 AM   Result Value Ref Range    Prothrombin time 13.0 11.5 - 15.2 sec    INR 1.0 0.8 - 1.2     PTT    Collection Time: 12/09/21  2:59 AM   Result Value Ref Range    aPTT 28.1 23.0 - 36.4 SEC   MAGNESIUM    Collection Time: 12/09/21  2:59 AM   Result Value Ref Range    Magnesium 2.0 1.6 - 2.6 mg/dL        Radiologic Studies -   XR CHEST PORT   Final Result   --------------      No evidence for active cardiopulmonary disease. No significant interval change.             CT ABD PELV W CONT   Final Result 3.6 x 3.1 x 4.1 cm collection within the gallbladder fossa. Consider the   possibility of an abscess. Ascending descending and sigmoid colon diverticulosis without evidence for   diverticulitis. Mild fatty infiltration of the liver. CT Results  (Last 48 hours)               12/09/21 0413  CT ABD PELV W CONT Final result    Impression:      3.6 x 3.1 x 4.1 cm collection within the gallbladder fossa. Consider the   possibility of an abscess. Ascending descending and sigmoid colon diverticulosis without evidence for   diverticulitis. Mild fatty infiltration of the liver. Narrative:  EXAM: CT of the abdomen and pelvis       INDICATION: Pain. COMPARISON: None. TECHNIQUE: Axial CT imaging of the abdomen and pelvis was performed with   intravenous contrast. Multiplanar reformats were generated. Dose reduction   techniques used: automated exposure control, adjustment of the mAs and/or kVp   according to patient size, and iterative reconstruction techniques. Digital   imaging and communications in Medicine (DICOM) format image data are available   to nonaffiliated external healthcare facilities or entities on a secure, media   free, reciprocally searchable basis with patient authorization for at least 12   months after this study. _______________       FINDINGS:       LOWER CHEST: Unremarkable. LIVER, BILIARY: The liver is of mild diminished attenuation. No biliary   dilation. There has been a prior cholecystectomy. There is a 3.6 x 3.1 x 4.1 cm   collection within the gallbladder fossa. PANCREAS: Normal.       SPLEEN: Normal.       ADRENALS: Normal.       KIDNEYS: There are multiple left renal cysts measuring up to 2.2 cm. There is no   hydronephrosis. LYMPH NODES: No enlarged lymph nodes. GASTROINTESTINAL TRACT: There are diverticula of the ascending, descending and   the sigmoid colon without diverticulitis.  There is thickening of the visualized   distal esophagus. There are diverticula of the descending and transverse   duodenum. PELVIC ORGANS: Unremarkable. VASCULATURE: Unremarkable. BONES: No acute or aggressive osseous abnormalities identified. OTHER: There is a left inguinal hernia containing portions of distal left colon   and fat without bowel obstruction. _______________               CXR Results  (Last 48 hours)               12/09/21 0418  XR CHEST PORT Final result    Impression:  --------------       No evidence for active cardiopulmonary disease. No significant interval change. Narrative:  ---------------------------------------------------------------------------   <<<<<<<<<           [facility]           >>>>>>>>>    ---------------------------------------------------------------------------       CLINICAL HISTORY:  Postop vomiting. Abdominal distention. COMPARISON EXAMINATIONS:  June 24, 2018. ---  SINGLE FRONTAL VIEW OF THE CHEST  ---       The cardiomediastinal silhouette is stable. There is no focal lung consolidation   or pleural effusion. No significant osseous abnormalities are identified.             --------------             Medications given in the ED-  Medications   iopamidoL (ISOVUE 300) 61 % contrast injection  mL (100 mL IntraVENous Given 12/9/21 0414)   piperacillin-tazobactam (ZOSYN) 4.5 g in 0.9% sodium chloride (MBP/ADV) 100 mL MBP (0 g IntraVENous IV Completed 12/9/21 0617)         Medical Decision Making   I am the first provider for this patient. I reviewed the vital signs, available nursing notes, past medical history, past surgical history, family history and social history. Vital Signs-Reviewed the patient's vital signs.     Pulse Oximetry Analysis - 97% on room air    Cardiac Monitor:  Rate: 62 bpm  Rhythm: Sinus rhythm    EKG interpretation: (Preliminary)  2:45 AM   Normal sinus rhythm, rate 73, left bundle branch with unremarkable ST segments, QTC is 500  EKG read by Aiden Nair MD      Records Reviewed: NURSING NOTES AND PREVIOUS MEDICAL RECORDS    Provider Notes (Medical Decision Making):   Patient with postoperative symptoms of vomiting abdominal pain but this is very light presentation almost 6 weeks. She really has not had significant food restrictions or difficulty breathing or vomiting since her surgery. Peptic ulcer disease GI bleed postoperative complications a hiatus concerns including ACS or pulmonary embolism (although this last month is very unlikely). CT did show evidence of seroma and had a white count. Patient was started antibiotics in case reviewed with her operating surgeon Dr. Esau holland.  She had no other evidence to suggest respiratory infection, pneumonia, meningitis or Covid (patient is already vaccinated x2) UTI. We will discharge her on cefdinir and she can follow-up with Dr. Padma Dyson in the next few days. Procedures:  Procedures    ED Course:   3:15 AM: Initial assessment performed. The patients presenting problems have been discussed, and they are in agreement with the care plan formulated and outlined with them. I have encouraged them to ask questions as they arise throughout their visit. CONSULT NOTE:   7:16 AM  Aiden Nair MD   spoke with Dr. Rolo Hernandez   Specialty: Surgery  Discussed pt's hx, disposition, and available diagnostic and imaging results  over the telephone. Reviewed care plans. Consulting physician agrees with plans as outlined. States several months unremarkable. We can discharge her on antibiotics but he was uncertain exactly what type of infection we be treating. We will place her on broad-spectrum Omnicef and he will see her early in follow-up. Diagnosis and Disposition       DISCHARGE NOTE:  7:39 AM  Mj Vasquez's  results have been reviewed with him. He has been counseled regarding his diagnosis, treatment, and plan.   He verbally conveys understanding and agreement of the signs, symptoms, diagnosis, treatment and prognosis and additionally agrees to follow up as discussed. He also agrees with the care-plan and conveys that all of his questions have been answered. I have also provided discharge instructions for him that include: educational information regarding their diagnosis and treatment, and list of reasons why they would want to return to the ED prior to their follow-up appointment, should his condition change. He has been provided with education for proper emergency department utilization. CLINICAL IMPRESSION:    1. Postoperative seroma involving digestive system after digestive system procedure    2. Leukocytosis, unspecified type    3. Non-intractable vomiting with nausea, unspecified vomiting type        PLAN:  1. D/C Home  2. Current Discharge Medication List        3. Follow-up Information    None       _______________________________    This note was partially transcribed via voice recognition software. Although efforts have been made to catch any discrepancies, it may contain sound alike words, grammatical errors, or nonsensical words.

## 2021-12-11 LAB
ATRIAL RATE: 73 BPM
CALCULATED P AXIS, ECG09: 52 DEGREES
CALCULATED R AXIS, ECG10: 70 DEGREES
CALCULATED T AXIS, ECG11: 173 DEGREES
DIAGNOSIS, 93000: NORMAL
P-R INTERVAL, ECG05: 156 MS
Q-T INTERVAL, ECG07: 454 MS
QRS DURATION, ECG06: 138 MS
QTC CALCULATION (BEZET), ECG08: 500 MS
VENTRICULAR RATE, ECG03: 73 BPM

## 2022-02-02 ENCOUNTER — APPOINTMENT (OUTPATIENT)
Dept: CT IMAGING | Age: 87
End: 2022-02-02
Attending: EMERGENCY MEDICINE
Payer: MEDICAID

## 2022-02-02 ENCOUNTER — HOSPITAL ENCOUNTER (EMERGENCY)
Age: 87
Discharge: HOME | End: 2022-02-02
Attending: EMERGENCY MEDICINE
Payer: MEDICAID

## 2022-02-02 VITALS
WEIGHT: 158 LBS | DIASTOLIC BLOOD PRESSURE: 66 MMHG | OXYGEN SATURATION: 98 % | SYSTOLIC BLOOD PRESSURE: 138 MMHG | TEMPERATURE: 97.1 F | HEIGHT: 64 IN | RESPIRATION RATE: 16 BRPM | HEART RATE: 99 BPM | BODY MASS INDEX: 26.98 KG/M2

## 2022-02-02 DIAGNOSIS — N39.0 URINARY TRACT INFECTION WITH HEMATURIA, SITE UNSPECIFIED: Primary | ICD-10-CM

## 2022-02-02 DIAGNOSIS — R31.9 URINARY TRACT INFECTION WITH HEMATURIA, SITE UNSPECIFIED: Primary | ICD-10-CM

## 2022-02-02 LAB
ALBUMIN SERPL-MCNC: 3.8 G/DL (ref 3.4–5)
ALBUMIN/GLOB SERPL: 1 {RATIO} (ref 0.8–1.7)
ALP SERPL-CCNC: 166 U/L (ref 45–117)
ALT SERPL-CCNC: 25 U/L (ref 16–61)
ANION GAP SERPL CALC-SCNC: 6 MMOL/L (ref 3–18)
APPEARANCE UR: ABNORMAL
AST SERPL-CCNC: 18 U/L (ref 10–38)
BACTERIA URNS QL MICRO: ABNORMAL /HPF
BASOPHILS # BLD: 0 K/UL (ref 0–0.1)
BASOPHILS NFR BLD: 0 % (ref 0–2)
BILIRUB SERPL-MCNC: 1.1 MG/DL (ref 0.2–1)
BILIRUB UR QL: NEGATIVE
BUN SERPL-MCNC: 10 MG/DL (ref 7–18)
BUN/CREAT SERPL: 16 (ref 12–20)
CALCIUM SERPL-MCNC: 9.4 MG/DL (ref 8.5–10.1)
CHLORIDE SERPL-SCNC: 104 MMOL/L (ref 100–111)
CO2 SERPL-SCNC: 28 MMOL/L (ref 21–32)
COLOR UR: YELLOW
CREAT SERPL-MCNC: 0.64 MG/DL (ref 0.6–1.3)
DIFFERENTIAL METHOD BLD: ABNORMAL
EOSINOPHIL # BLD: 0 K/UL (ref 0–0.4)
EOSINOPHIL NFR BLD: 0 % (ref 0–5)
EPITH CASTS URNS QL MICRO: ABNORMAL /LPF (ref 0–5)
ERYTHROCYTE [DISTWIDTH] IN BLOOD BY AUTOMATED COUNT: 13.8 % (ref 11.6–14.5)
GLOBULIN SER CALC-MCNC: 3.8 G/DL (ref 2–4)
GLUCOSE SERPL-MCNC: 118 MG/DL (ref 74–99)
GLUCOSE UR STRIP.AUTO-MCNC: NEGATIVE MG/DL
HCT VFR BLD AUTO: 45.6 % (ref 36–48)
HGB BLD-MCNC: 15 G/DL (ref 13–16)
HGB UR QL STRIP: ABNORMAL
IMM GRANULOCYTES # BLD AUTO: 0.2 K/UL (ref 0–0.04)
IMM GRANULOCYTES NFR BLD AUTO: 1 % (ref 0–0.5)
KETONES UR QL STRIP.AUTO: NEGATIVE MG/DL
LEUKOCYTE ESTERASE UR QL STRIP.AUTO: ABNORMAL
LYMPHOCYTES # BLD: 2.3 K/UL (ref 0.9–3.6)
LYMPHOCYTES NFR BLD: 13 % (ref 21–52)
MCH RBC QN AUTO: 28.8 PG (ref 24–34)
MCHC RBC AUTO-ENTMCNC: 32.9 G/DL (ref 31–37)
MCV RBC AUTO: 87.5 FL (ref 78–100)
MONOCYTES # BLD: 1.6 K/UL (ref 0.05–1.2)
MONOCYTES NFR BLD: 9 % (ref 3–10)
NEUTS SEG # BLD: 13.9 K/UL (ref 1.8–8)
NEUTS SEG NFR BLD: 77 % (ref 40–73)
NITRITE UR QL STRIP.AUTO: POSITIVE
NRBC # BLD: 0 K/UL (ref 0–0.01)
NRBC BLD-RTO: 0 PER 100 WBC
PH UR STRIP: 5.5 [PH] (ref 5–8)
PLATELET # BLD AUTO: 247 K/UL (ref 135–420)
PLATELET COMMENTS,PCOM: ABNORMAL
PMV BLD AUTO: 10.3 FL (ref 9.2–11.8)
POTASSIUM SERPL-SCNC: 4.1 MMOL/L (ref 3.5–5.5)
PROT SERPL-MCNC: 7.6 G/DL (ref 6.4–8.2)
PROT UR STRIP-MCNC: 100 MG/DL
RBC # BLD AUTO: 5.21 M/UL (ref 4.35–5.65)
RBC #/AREA URNS HPF: ABNORMAL /HPF (ref 0–5)
RBC MORPH BLD: ABNORMAL
SODIUM SERPL-SCNC: 138 MMOL/L (ref 136–145)
SP GR UR REFRACTOMETRY: >1.03 (ref 1–1.03)
UROBILINOGEN UR QL STRIP.AUTO: 0.2 EU/DL (ref 0.2–1)
WBC # BLD AUTO: 18 K/UL (ref 4.6–13.2)
WBC URNS QL MICRO: ABNORMAL /HPF (ref 0–5)

## 2022-02-02 PROCEDURE — 74011250636 HC RX REV CODE- 250/636: Performed by: EMERGENCY MEDICINE

## 2022-02-02 PROCEDURE — 80053 COMPREHEN METABOLIC PANEL: CPT

## 2022-02-02 PROCEDURE — 74011000250 HC RX REV CODE- 250: Performed by: EMERGENCY MEDICINE

## 2022-02-02 PROCEDURE — 85025 COMPLETE CBC W/AUTO DIFF WBC: CPT

## 2022-02-02 PROCEDURE — 96372 THER/PROPH/DIAG INJ SC/IM: CPT

## 2022-02-02 PROCEDURE — 74176 CT ABD & PELVIS W/O CONTRAST: CPT

## 2022-02-02 PROCEDURE — 81001 URINALYSIS AUTO W/SCOPE: CPT

## 2022-02-02 PROCEDURE — 99283 EMERGENCY DEPT VISIT LOW MDM: CPT

## 2022-02-02 RX ORDER — CIPROFLOXACIN 500 MG/1
500 TABLET ORAL 2 TIMES DAILY
Qty: 14 TABLET | Refills: 0 | Status: SHIPPED | OUTPATIENT
Start: 2022-02-02 | End: 2022-02-09

## 2022-02-02 RX ADMIN — LIDOCAINE HYDROCHLORIDE 1 G: 10 INJECTION, SOLUTION EPIDURAL; INFILTRATION; INTRACAUDAL; PERINEURAL at 14:40

## 2022-02-02 NOTE — ED PROVIDER NOTES
72-year-old male presents to the emergency department with frequent urination and blood in urine. Patient had a recent history of cholecystectomy. Patient has no fevers no chills no abdominal pain no back pain. Patient came because of his urinary complaints. Patient has no nausea no vomiting. His pain is worse with urination he has no pain if he is not urinating. Patient did notice blood in his urine x3 today. He was up and going to the bathroom multiple times last night. Patient has no other complaints. History reviewed. No pertinent past medical history. Past Surgical History:   Procedure Laterality Date    HX CHOLECYSTECTOMY           History reviewed. No pertinent family history. Social History     Socioeconomic History    Marital status:      Spouse name: Not on file    Number of children: Not on file    Years of education: Not on file    Highest education level: Not on file   Occupational History    Not on file   Tobacco Use    Smoking status: Former Smoker    Smokeless tobacco: Not on file   Substance and Sexual Activity    Alcohol use: No    Drug use: Never    Sexual activity: Not on file   Other Topics Concern    Not on file   Social History Narrative    Not on file     Social Determinants of Health     Financial Resource Strain:     Difficulty of Paying Living Expenses: Not on file   Food Insecurity:     Worried About [address] [employer] in the Last Year: Not on file    Jenise of Food in the Last Year: Not on file   Transportation Needs:     Lack of Transportation (Medical): Not on file    Lack of Transportation (Non-Medical):  Not on file   Physical Activity:     Days of Exercise per Week: Not on file    Minutes of Exercise per Session: Not on file   Stress:     Feeling of Stress : Not on file   Social Connections:     Frequency of Communication with Friends and Family: Not on file    Frequency of Social Gatherings with Friends and Family: Not on file  Attends Restoration Services: Not on file    Active Member of Clubs or Organizations: Not on file    Attends Club or Organization Meetings: Not on file    Marital Status: Not on file   Intimate Partner Violence:     Fear of Current or Ex-Partner: Not on file    Emotionally Abused: Not on file    Physically Abused: Not on file    Sexually Abused: Not on file   Housing Stability:     Unable to Pay for Housing in the Last Year: Not on file    Number of [place] in the Last Year: Not on file    Unstable Housing in the Last Year: Not on file         ALLERGIES: Patient has no known allergies. Review of Systems   Constitutional: Negative. HENT: Negative. Respiratory: Negative. Gastrointestinal: Negative. Genitourinary: Positive for hematuria and urgency. Musculoskeletal: Negative. Neurological: Negative. Hematological: Negative. Psychiatric/Behavioral: Negative. All other systems reviewed and are negative. Vitals:    02/02/22 1156   BP: 138/66   Pulse: 99   Resp: 16   Temp: 97.1 °F (36.2 °C)   SpO2: 98%   Weight: 71.7 kg (158 lb)   Height: 5' 4\" (1.626 m)            Physical Exam  Vitals and nursing note reviewed. Constitutional:       General: He is not in acute distress. Appearance: Normal appearance. He is not ill-appearing, toxic-appearing or diaphoretic. HENT:      Head: Normocephalic and atraumatic. Nose: Nose normal.      Mouth/Throat:      Mouth: Mucous membranes are moist.   Eyes:      Extraocular Movements: Extraocular movements intact. Pupils: Pupils are equal, round, and reactive to light. Cardiovascular:      Rate and Rhythm: Normal rate and regular rhythm. Pulses: Normal pulses. Heart sounds: Normal heart sounds. Pulmonary:      Effort: Pulmonary effort is normal.      Breath sounds: Normal breath sounds. Abdominal:      General: There is no distension. Palpations: Abdomen is soft. There is no mass. Tenderness:  There is no abdominal tenderness. There is no right CVA tenderness, left CVA tenderness, guarding or rebound. Hernia: No hernia is present. Musculoskeletal:         General: Normal range of motion. Cervical back: Normal range of motion and neck supple. Skin:     General: Skin is warm. Capillary Refill: Capillary refill takes less than 2 seconds. Neurological:      General: No focal deficit present. Mental Status: He is alert. Psychiatric:         Mood and Affect: Mood normal.         Behavior: Behavior normal.          MDM  Number of Diagnoses or Management Options  Diagnosis management comments: Differential diagnosis UTI renal lithiasis bladder cancer    I discussed case with patient and daughter. Patient does have elevated white count. He is asymptomatic. He only has symptoms when he urinates. Patient looks well nontoxic. He prefers to go home. We will give a dose of antibiotics in the ER. And then give him by mouth medications. We will see the patient on Friday or Saturday for reevaluation. Family is comfortable with plan. Instructed to come back if worse.        Amount and/or Complexity of Data Reviewed  Clinical lab tests: reviewed  Tests in the radiology section of CPT®: reviewed  Tests in the medicine section of CPT®: ordered and reviewed    Risk of Complications, Morbidity, and/or Mortality  Presenting problems: moderate  Diagnostic procedures: moderate  Management options: moderate    Patient Progress  Patient progress: improved         Procedures

## 2022-02-05 ENCOUNTER — HOSPITAL ENCOUNTER (EMERGENCY)
Age: 87
Discharge: HOME | End: 2022-02-05
Attending: EMERGENCY MEDICINE
Payer: MEDICAID

## 2022-02-05 VITALS
BODY MASS INDEX: 22.9 KG/M2 | OXYGEN SATURATION: 99 % | WEIGHT: 160 LBS | DIASTOLIC BLOOD PRESSURE: 63 MMHG | TEMPERATURE: 98 F | HEIGHT: 70 IN | SYSTOLIC BLOOD PRESSURE: 148 MMHG | HEART RATE: 68 BPM | RESPIRATION RATE: 18 BRPM

## 2022-02-05 DIAGNOSIS — N39.0 URINARY TRACT INFECTION WITH HEMATURIA, SITE UNSPECIFIED: Primary | ICD-10-CM

## 2022-02-05 DIAGNOSIS — R31.9 URINARY TRACT INFECTION WITH HEMATURIA, SITE UNSPECIFIED: Primary | ICD-10-CM

## 2022-02-05 LAB
ANION GAP SERPL CALC-SCNC: 4 MMOL/L (ref 3–18)
APPEARANCE UR: CLEAR
BACTERIA URNS QL MICRO: ABNORMAL /HPF
BASOPHILS # BLD: 0.1 K/UL (ref 0–0.1)
BASOPHILS NFR BLD: 1 % (ref 0–2)
BILIRUB UR QL: NEGATIVE
BUN SERPL-MCNC: 15 MG/DL (ref 7–18)
BUN/CREAT SERPL: 23 (ref 12–20)
CALCIUM SERPL-MCNC: 9.5 MG/DL (ref 8.5–10.1)
CHLORIDE SERPL-SCNC: 107 MMOL/L (ref 100–111)
CO2 SERPL-SCNC: 28 MMOL/L (ref 21–32)
COLOR UR: YELLOW
CREAT SERPL-MCNC: 0.66 MG/DL (ref 0.6–1.3)
DIFFERENTIAL METHOD BLD: ABNORMAL
EOSINOPHIL # BLD: 0.2 K/UL (ref 0–0.4)
EOSINOPHIL NFR BLD: 2 % (ref 0–5)
EPITH CASTS URNS QL MICRO: 0 /LPF (ref 0–5)
ERYTHROCYTE [DISTWIDTH] IN BLOOD BY AUTOMATED COUNT: 13.5 % (ref 11.6–14.5)
GLUCOSE SERPL-MCNC: 86 MG/DL (ref 74–99)
GLUCOSE UR STRIP.AUTO-MCNC: NEGATIVE MG/DL
HCT VFR BLD AUTO: 46.3 % (ref 36–48)
HGB BLD-MCNC: 15 G/DL (ref 13–16)
HGB UR QL STRIP: ABNORMAL
IMM GRANULOCYTES # BLD AUTO: 0.1 K/UL (ref 0–0.04)
IMM GRANULOCYTES NFR BLD AUTO: 1 % (ref 0–0.5)
KETONES UR QL STRIP.AUTO: NEGATIVE MG/DL
LEUKOCYTE ESTERASE UR QL STRIP.AUTO: ABNORMAL
LYMPHOCYTES # BLD: 2.5 K/UL (ref 0.9–3.6)
LYMPHOCYTES NFR BLD: 28 % (ref 21–52)
MCH RBC QN AUTO: 28.6 PG (ref 24–34)
MCHC RBC AUTO-ENTMCNC: 32.4 G/DL (ref 31–37)
MCV RBC AUTO: 88.2 FL (ref 78–100)
MONOCYTES # BLD: 1 K/UL (ref 0.05–1.2)
MONOCYTES NFR BLD: 12 % (ref 3–10)
NEUTS SEG # BLD: 5 K/UL (ref 1.8–8)
NEUTS SEG NFR BLD: 57 % (ref 40–73)
NITRITE UR QL STRIP.AUTO: NEGATIVE
NRBC # BLD: 0 K/UL (ref 0–0.01)
NRBC BLD-RTO: 0 PER 100 WBC
PH UR STRIP: 5 [PH] (ref 5–8)
PLATELET # BLD AUTO: 258 K/UL (ref 135–420)
PMV BLD AUTO: 10.2 FL (ref 9.2–11.8)
POTASSIUM SERPL-SCNC: 3.7 MMOL/L (ref 3.5–5.5)
PROT UR STRIP-MCNC: ABNORMAL MG/DL
RBC # BLD AUTO: 5.25 M/UL (ref 4.35–5.65)
RBC #/AREA URNS HPF: ABNORMAL /HPF (ref 0–5)
SODIUM SERPL-SCNC: 139 MMOL/L (ref 136–145)
SP GR UR REFRACTOMETRY: 1.02 (ref 1–1.03)
UROBILINOGEN UR QL STRIP.AUTO: 0.2 EU/DL (ref 0.2–1)
WBC # BLD AUTO: 8.9 K/UL (ref 4.6–13.2)
WBC URNS QL MICRO: ABNORMAL /HPF (ref 0–5)

## 2022-02-05 PROCEDURE — 99283 EMERGENCY DEPT VISIT LOW MDM: CPT

## 2022-02-05 PROCEDURE — 80048 BASIC METABOLIC PNL TOTAL CA: CPT

## 2022-02-05 PROCEDURE — 81001 URINALYSIS AUTO W/SCOPE: CPT

## 2022-02-05 PROCEDURE — 85025 COMPLETE CBC W/AUTO DIFF WBC: CPT

## 2022-02-06 NOTE — ED PROVIDER NOTES
68-year-old healthy male presents to the ED for follow-up appointment. I saw the patient 2 days ago he had a urinary tract infection. Patient had a white count of 18,000 at that time. Patient looks well no fever. I put him on antibiotics and told to return for recheck of CBC. Patient has been taking antibiotics urinary symptoms have improved patient has no nausea no vomiting no fevers no chills no abdominal pain no other issues expressed. No past medical history on file. Past Surgical History:   Procedure Laterality Date    HX CHOLECYSTECTOMY           No family history on file. Social History     Socioeconomic History    Marital status:      Spouse name: Not on file    Number of children: Not on file    Years of education: Not on file    Highest education level: Not on file   Occupational History    Not on file   Tobacco Use    Smoking status: Former Smoker    Smokeless tobacco: Not on file   Substance and Sexual Activity    Alcohol use: No    Drug use: Never    Sexual activity: Not on file   Other Topics Concern    Not on file   Social History Narrative    Not on file     Social Determinants of Health     Financial Resource Strain:     Difficulty of Paying Living Expenses: Not on file   Food Insecurity:     Worried About [employer] in the Last Year: Not on file    Jenise of Food in the Last Year: Not on file   Transportation Needs:     Lack of Transportation (Medical): Not on file    Lack of Transportation (Non-Medical):  Not on file   Physical Activity:     Days of Exercise per Week: Not on file    Minutes of Exercise per Session: Not on file   Stress:     Feeling of Stress : Not on file   Social Connections:     Frequency of Communication with Friends and Family: Not on file    Frequency of Social Gatherings with Friends and Family: Not on file    Attends Worship Services: Not on file    Active Member of Clubs or Organizations: Not on file   Benjamin Islas Attends Club or Organization Meetings: Not on file    Marital Status: Not on file   Intimate Partner Violence:     Fear of Current or Ex-Partner: Not on file    Emotionally Abused: Not on file    Physically Abused: Not on file    Sexually Abused: Not on file   Housing Stability:     Unable to Pay for Housing in the Last Year: Not on file    Number of [place] in the Last Year: Not on file    Unstable Housing in the Last Year: Not on file         ALLERGIES: Patient has no known allergies. Review of Systems   HENT: Negative. Eyes: Negative. Respiratory: Negative. Gastrointestinal: Negative. Genitourinary: Positive for dysuria. Negative for difficulty urinating, enuresis, flank pain, frequency, genital sores, hematuria, penile discharge, penile pain and penile swelling. Musculoskeletal: Negative. Neurological: Negative. All other systems reviewed and are negative. Vitals:    02/05/22 1911 02/05/22 1914   BP: (!) 148/63    Pulse: 68    Resp: 18    Temp:  98 °F (36.7 °C)   SpO2: 99%    Weight: 72.6 kg (160 lb)    Height: 5' 10\" (1.778 m)             Physical Exam  Vitals and nursing note reviewed. Constitutional:       Appearance: Normal appearance. HENT:      Head: Normocephalic and atraumatic. Nose: Nose normal.      Mouth/Throat:      Mouth: Mucous membranes are moist.   Cardiovascular:      Rate and Rhythm: Normal rate and regular rhythm. Pulses: Normal pulses. Heart sounds: Normal heart sounds. Pulmonary:      Effort: Pulmonary effort is normal. No respiratory distress. Breath sounds: Normal breath sounds. No stridor. No wheezing, rhonchi or rales. Chest:      Chest wall: No tenderness. Abdominal:      General: There is no distension. Palpations: Abdomen is soft. There is no mass. Tenderness: There is no abdominal tenderness. There is no right CVA tenderness, left CVA tenderness, guarding or rebound. Hernia: No hernia is present. Musculoskeletal:         General: Normal range of motion. Cervical back: Normal range of motion and neck supple. Skin:     General: Skin is warm. Capillary Refill: Capillary refill takes less than 2 seconds. Neurological:      General: No focal deficit present. Mental Status: He is alert. Psychiatric:         Mood and Affect: Mood normal.         Behavior: Behavior normal.          MDM  Number of Diagnoses or Management Options  Diagnosis management comments: He Continues to look well. I went over her labs with her daughter.   We will have patient continue antibiotics follow-up with PCP as necessary       Amount and/or Complexity of Data Reviewed  Clinical lab tests: ordered and reviewed  Tests in the radiology section of CPT®: ordered and reviewed    Risk of Complications, Morbidity, and/or Mortality  Presenting problems: moderate  Diagnostic procedures: moderate  Management options: moderate           Procedures

## 2022-02-06 NOTE — ED TRIAGE NOTES
Follow-up for elevated WBC 2 days ago. Started on PO abx for UTI-denies any sx currently. Per family, requesting to only see Dr. Ramirez Jonas MD said he specifically wants to follow-up with this Pt.

## 2022-03-18 PROBLEM — R10.9 ABDOMINAL PAIN: Status: ACTIVE | Noted: 2021-10-24

## 2022-03-18 PROBLEM — R74.8 ABNORMAL TRANSAMINASES: Status: ACTIVE | Noted: 2021-10-24

## 2022-03-18 PROBLEM — E78.5 HYPERLIPEMIA: Status: ACTIVE | Noted: 2021-10-24

## 2022-03-18 PROBLEM — I10 HTN (HYPERTENSION): Status: ACTIVE | Noted: 2021-10-24

## 2022-03-18 PROBLEM — K80.12 CALCULUS OF GALLBLADDER WITH ACUTE ON CHRONIC CHOLECYSTITIS WITHOUT OBSTRUCTION: Status: ACTIVE | Noted: 2021-10-25

## 2022-03-19 PROBLEM — N28.89 RENAL MASS: Status: ACTIVE | Noted: 2021-10-24

## 2022-03-19 PROBLEM — R31.9 HEMATURIA: Status: ACTIVE | Noted: 2021-10-24

## 2022-03-19 PROBLEM — R93.2 ABNORMAL LIVER ULTRASOUND: Status: ACTIVE | Noted: 2021-10-25

## 2022-03-19 PROBLEM — K80.70 CALCULUS OF GALLBLADDER AND BILE DUCT WITHOUT CHOLECYSTITIS: Status: ACTIVE | Noted: 2021-10-24

## 2022-03-20 PROBLEM — R11.2 NAUSEA AND VOMITING: Status: ACTIVE | Noted: 2021-10-24

## 2025-01-13 ENCOUNTER — APPOINTMENT (OUTPATIENT)
Facility: HOSPITAL | Age: 89
End: 2025-01-13
Payer: MEDICAID

## 2025-01-13 ENCOUNTER — HOSPITAL ENCOUNTER (EMERGENCY)
Facility: HOSPITAL | Age: 89
Discharge: HOME | End: 2025-01-13
Attending: STUDENT IN AN ORGANIZED HEALTH CARE EDUCATION/TRAINING PROGRAM
Payer: MEDICAID

## 2025-01-13 VITALS
HEART RATE: 75 BPM | HEIGHT: 66 IN | SYSTOLIC BLOOD PRESSURE: 122 MMHG | TEMPERATURE: 98.2 F | BODY MASS INDEX: 25.07 KG/M2 | RESPIRATION RATE: 19 BRPM | DIASTOLIC BLOOD PRESSURE: 76 MMHG | WEIGHT: 156 LBS | OXYGEN SATURATION: 97 %

## 2025-01-13 DIAGNOSIS — R19.7 DIARRHEA, UNSPECIFIED TYPE: ICD-10-CM

## 2025-01-13 DIAGNOSIS — R07.9 CHEST PAIN, UNSPECIFIED TYPE: ICD-10-CM

## 2025-01-13 DIAGNOSIS — R05.1 ACUTE COUGH: Primary | ICD-10-CM

## 2025-01-13 LAB
ALBUMIN SERPL-MCNC: 3.7 G/DL (ref 3.4–5)
ALBUMIN/GLOB SERPL: 1 (ref 0.8–1.7)
ALP SERPL-CCNC: 116 U/L (ref 45–117)
ALT SERPL-CCNC: 66 U/L (ref 16–61)
ANION GAP SERPL CALC-SCNC: 4 MMOL/L (ref 3–18)
AST SERPL-CCNC: 44 U/L (ref 10–38)
BASOPHILS # BLD: 0.03 K/UL (ref 0–0.1)
BASOPHILS NFR BLD: 0.4 % (ref 0–2)
BILIRUB DIRECT SERPL-MCNC: 0.2 MG/DL (ref 0–0.2)
BILIRUB SERPL-MCNC: 0.6 MG/DL (ref 0.2–1)
BUN SERPL-MCNC: 17 MG/DL (ref 7–18)
BUN/CREAT SERPL: 26 (ref 12–20)
CALCIUM SERPL-MCNC: 9.3 MG/DL (ref 8.5–10.1)
CHLORIDE SERPL-SCNC: 104 MMOL/L (ref 100–111)
CO2 SERPL-SCNC: 27 MMOL/L (ref 21–32)
CREAT SERPL-MCNC: 0.66 MG/DL (ref 0.6–1.3)
DIFFERENTIAL METHOD BLD: ABNORMAL
EOSINOPHIL # BLD: 0.08 K/UL (ref 0–0.4)
EOSINOPHIL NFR BLD: 1 % (ref 0–5)
ERYTHROCYTE [DISTWIDTH] IN BLOOD BY AUTOMATED COUNT: 12.7 % (ref 11.6–14.5)
FLUAV RNA SPEC QL NAA+PROBE: NOT DETECTED
FLUBV RNA SPEC QL NAA+PROBE: NOT DETECTED
GLOBULIN SER CALC-MCNC: 3.7 G/DL (ref 2–4)
GLUCOSE SERPL-MCNC: 114 MG/DL (ref 74–99)
HCT VFR BLD AUTO: 47.6 % (ref 36–48)
HGB BLD-MCNC: 16.9 G/DL (ref 13–16)
IMM GRANULOCYTES # BLD AUTO: 0.06 K/UL (ref 0–0.04)
IMM GRANULOCYTES NFR BLD AUTO: 0.7 % (ref 0–0.5)
LIPASE SERPL-CCNC: 26 U/L (ref 13–75)
LYMPHOCYTES # BLD: 2.33 K/UL (ref 0.9–3.3)
LYMPHOCYTES NFR BLD: 28.8 % (ref 21–52)
MCH RBC QN AUTO: 31.7 PG (ref 24–34)
MCHC RBC AUTO-ENTMCNC: 35.5 G/DL (ref 31–37)
MCV RBC AUTO: 89.3 FL (ref 78–100)
MONOCYTES # BLD: 0.91 K/UL (ref 0.05–1.2)
MONOCYTES NFR BLD: 11.2 % (ref 3–10)
NEUTS SEG # BLD: 4.69 K/UL (ref 1.8–8)
NEUTS SEG NFR BLD: 57.9 % (ref 40–73)
NRBC # BLD: 0 K/UL (ref 0–0.01)
NRBC BLD-RTO: 0 PER 100 WBC
PLATELET # BLD AUTO: 281 K/UL (ref 135–420)
PMV BLD AUTO: 10.3 FL (ref 9.2–11.8)
POTASSIUM SERPL-SCNC: 4.1 MMOL/L (ref 3.5–5.5)
PROT SERPL-MCNC: 7.4 G/DL (ref 6.4–8.2)
RBC # BLD AUTO: 5.33 M/UL (ref 4.35–5.65)
SARS-COV-2 RNA RESP QL NAA+PROBE: NOT DETECTED
SODIUM SERPL-SCNC: 135 MMOL/L (ref 136–145)
SOURCE: NORMAL
TROPONIN I SERPL HS-MCNC: 8 NG/L (ref 0–78)
WBC # BLD AUTO: 8.1 K/UL (ref 4.6–13.2)

## 2025-01-13 PROCEDURE — 84484 ASSAY OF TROPONIN QUANT: CPT

## 2025-01-13 PROCEDURE — 80076 HEPATIC FUNCTION PANEL: CPT

## 2025-01-13 PROCEDURE — 6360000002 HC RX W HCPCS: Performed by: STUDENT IN AN ORGANIZED HEALTH CARE EDUCATION/TRAINING PROGRAM

## 2025-01-13 PROCEDURE — 85025 COMPLETE CBC W/AUTO DIFF WBC: CPT

## 2025-01-13 PROCEDURE — 80048 BASIC METABOLIC PNL TOTAL CA: CPT

## 2025-01-13 PROCEDURE — 83690 ASSAY OF LIPASE: CPT

## 2025-01-13 PROCEDURE — 99284 EMERGENCY DEPT VISIT MOD MDM: CPT

## 2025-01-13 PROCEDURE — 87636 SARSCOV2 & INF A&B AMP PRB: CPT

## 2025-01-13 PROCEDURE — 71046 X-RAY EXAM CHEST 2 VIEWS: CPT

## 2025-01-13 RX ORDER — ALBUTEROL SULFATE 90 UG/1
2 INHALANT RESPIRATORY (INHALATION) EVERY 4 HOURS PRN
Qty: 18 G | Refills: 3 | Status: SHIPPED | OUTPATIENT
Start: 2025-01-13

## 2025-01-13 RX ORDER — ALBUTEROL SULFATE 0.83 MG/ML
2.5 SOLUTION RESPIRATORY (INHALATION)
Status: COMPLETED | OUTPATIENT
Start: 2025-01-13 | End: 2025-01-13

## 2025-01-13 RX ORDER — BENZONATATE 200 MG/1
200 CAPSULE ORAL 3 TIMES DAILY PRN
Qty: 20 CAPSULE | Refills: 0 | Status: SHIPPED | OUTPATIENT
Start: 2025-01-13 | End: 2025-01-20

## 2025-01-13 RX ADMIN — ALBUTEROL SULFATE 2.5 MG: 2.5 SOLUTION RESPIRATORY (INHALATION) at 21:37

## 2025-01-13 NOTE — ED TRIAGE NOTES
Patient ambulatory with walker to triage.  Patient c/o cough, diarrhea x two days.  Patient's daughter reports \"whole house has that virus that makes everyone throw up\".

## 2025-01-14 NOTE — ED PROVIDER NOTES
metabolic panel with no significant electrolyte derangements.  AST and ALT slightly elevated but no elevation of direct bilirubin to suggest right upper quadrant obstructive pathology.  Lipase is within normal limits so do not suspect pancreatitis.  Given constellation of symptoms most likely patient has viral illness of unknown etiology.  He has no oxygen requirements.  He did say that he felt better after albuterol treatment so will prescribe inhaler and cough suppressant for now. [DE]      ED Course User Index  [DE] John Kidd MD         FINAL IMPRESSION     1. Acute cough    2. Chest pain, unspecified type    3. Diarrhea, unspecified type          DISPOSITION/PLAN   DISPOSITION Decision To Discharge 01/13/2025 10:21:18 PM   DISPOSITION CONDITION Stable            DISCHARGE MEDICATIONS:     Medication List        START taking these medications      albuterol sulfate  (90 Base) MCG/ACT inhaler  Commonly known as: PROVENTIL;VENTOLIN;PROAIR  Inhale 2 puffs into the lungs every 4 hours as needed for Wheezing     benzonatate 200 MG capsule  Commonly known as: TESSALON  Take 1 capsule by mouth 3 times daily as needed for Cough            ASK your doctor about these medications      hydroCHLOROthiazide 12.5 MG tablet     ondansetron 4 MG tablet  Commonly known as: ZOFRAN     potassium chloride 20 MEQ extended release tablet  Commonly known as: KLOR-CON M               Where to Get Your Medications        Information about where to get these medications is not yet available    Ask your nurse or doctor about these medications  albuterol sulfate  (90 Base) MCG/ACT inhaler  benzonatate 200 MG capsule           DISCONTINUED MEDICATIONS:  Discharge Medication List as of 1/13/2025 10:27 PM          I am the Primary Clinician of Record.   John Kidd MD (electronically signed)         John Kidd MD  01/14/25 [id number]

## 2025-01-14 NOTE — DISCHARGE INSTRUCTIONS
All of your blood work and x-rays looked good.  There was no pneumonia therefore you do not need antibiotics.  Please try using the inhaler that we prescribed to see if this helps.  You can also take other medications that are available over-the-counter.  Please talk to the pharmacist at whichever story you choose to go to and ask them their opinion on what would be the safest for your symptoms.

## (undated) DEVICE — SUT MONOCRYL PLUS UD 4-0 --

## (undated) DEVICE — GLOVE ORANGE PI 7 1/2   MSG9075

## (undated) DEVICE — SYR 10ML LUER LOK 1/5ML GRAD --

## (undated) DEVICE — TROCARS: Brand: KII® BALLOON BLUNT TIP SYSTEM

## (undated) DEVICE — GLOVE SURG SZ 8 L12IN THK75MIL DK GRN LTX FREE

## (undated) DEVICE — INSUFFLATION NEEDLE TO ESTABLISH PNEUMOPERITONEUM.: Brand: INSUFFLATION NEEDLE

## (undated) DEVICE — ENDOCUT SCISSOR TIP, DISPOSABLE: Brand: RENEW

## (undated) DEVICE — LAPAROSCOPIC TROCAR SLEEVE/SINGLE USE: Brand: KII® OPTICAL ACCESS SYSTEM

## (undated) DEVICE — APPLIER CLP M L L11.4IN DIA10MM ENDOSCP ROT MULT FOR LIG

## (undated) DEVICE — RESERVOIR,SUCTION,100CC,SILICONE: Brand: MEDLINE

## (undated) DEVICE — DRAIN SURG 19FR 0.25IN SIL RND W/ TRCR INDIC DOT RADPQ FULL

## (undated) DEVICE — SUTURE SZ 0 27IN 5/8 CIR UR-6  TAPER PT VIOLET ABSRB VICRYL J603H

## (undated) DEVICE — E-Z CLEAN, PTFE COATED, ELECTROSURGICAL LAPAROSCOPIC ELECTRODE, L-HOOK, 33 CM., SINGLE-USE, FOR USE WITH HAND CONTROL PENCIL: Brand: MEGADYNE

## (undated) DEVICE — LAP CHOLE: Brand: MEDLINE INDUSTRIES, INC.

## (undated) DEVICE — DERMABOND SKIN ADH 0.7ML --

## (undated) DEVICE — SUT ETHLN 3-0 18IN PS2 BLK --

## (undated) DEVICE — SOLUTION LACTATED RINGERS INJECTION USP

## (undated) DEVICE — SOLUTION IV 1000ML 0.9% SOD CHL

## (undated) DEVICE — DISPOSABLE SUCTION/IRRIGATOR TUBE SET WITH TIP: Brand: AHTO

## (undated) DEVICE — SYR 50ML LR LCK 1ML GRAD NSAF --

## (undated) DEVICE — TROCAR: Brand: KII® OPTICAL ACCESS SYSTEM

## (undated) DEVICE — SHEAR RMFG HARMONIC 5MMX45CM -- LAWSON OEM ITEM 322220

## (undated) DEVICE — SUTURE VCRL SZ 3-0 L27IN ABSRB UD L26MM SH 1/2 CIR J416H

## (undated) DEVICE — TROCAR RMFG Z 3RD SLEEVE 5X100 -- LAWSON OEM ITEM 262365 PK/6

## (undated) DEVICE — DEVICE SUT W/ V-LOC SUT SZ 2-0 L8IN ABSRB LD UNIT SUT BARB

## (undated) DEVICE — PAD NON-ADHERENT 3X4 STRL LF --